# Patient Record
Sex: FEMALE | Race: WHITE | Employment: OTHER | URBAN - METROPOLITAN AREA
[De-identification: names, ages, dates, MRNs, and addresses within clinical notes are randomized per-mention and may not be internally consistent; named-entity substitution may affect disease eponyms.]

---

## 2017-08-07 ENCOUNTER — OFFICE VISIT (OUTPATIENT)
Dept: ORTHOPEDIC SURGERY | Age: 57
End: 2017-08-07

## 2017-08-07 VITALS — BODY MASS INDEX: 51.91 KG/M2 | WEIGHT: 293 LBS | HEIGHT: 63 IN

## 2017-08-07 DIAGNOSIS — M25.562 CHRONIC PAIN OF LEFT KNEE: Primary | ICD-10-CM

## 2017-08-07 DIAGNOSIS — E66.01 MORBID OBESITY WITH BMI OF 60.0-69.9, ADULT (HCC): ICD-10-CM

## 2017-08-07 DIAGNOSIS — G89.29 CHRONIC PAIN OF LEFT KNEE: Primary | ICD-10-CM

## 2017-08-07 DIAGNOSIS — Z96.651 STATUS POST TOTAL RIGHT KNEE REPLACEMENT USING CEMENT: ICD-10-CM

## 2017-08-07 DIAGNOSIS — M17.12 PRIMARY OSTEOARTHRITIS OF LEFT KNEE: ICD-10-CM

## 2017-08-07 PROCEDURE — 20610 DRAIN/INJ JOINT/BURSA W/O US: CPT | Performed by: ORTHOPAEDIC SURGERY

## 2017-08-07 RX ORDER — LANOLIN ALCOHOL/MO/W.PET/CERES
1000 CREAM (GRAM) TOPICAL 3 TIMES DAILY
COMMUNITY

## 2018-06-04 ENCOUNTER — TELEPHONE (OUTPATIENT)
Dept: BARIATRICS/WEIGHT MGMT | Age: 58
End: 2018-06-04

## 2019-06-11 ENCOUNTER — TELEPHONE (OUTPATIENT)
Dept: BARIATRICS/WEIGHT MGMT | Age: 59
End: 2019-06-11

## 2019-12-02 ENCOUNTER — OFFICE VISIT (OUTPATIENT)
Dept: ORTHOPEDIC SURGERY | Age: 59
End: 2019-12-02
Payer: COMMERCIAL

## 2019-12-02 VITALS — WEIGHT: 227 LBS | HEIGHT: 63 IN | BODY MASS INDEX: 40.22 KG/M2

## 2019-12-02 DIAGNOSIS — M17.12 PRIMARY OSTEOARTHRITIS OF LEFT KNEE: ICD-10-CM

## 2019-12-02 DIAGNOSIS — M25.562 LEFT KNEE PAIN, UNSPECIFIED CHRONICITY: Primary | ICD-10-CM

## 2019-12-02 PROCEDURE — 20611 DRAIN/INJ JOINT/BURSA W/US: CPT | Performed by: ORTHOPAEDIC SURGERY

## 2019-12-02 PROCEDURE — 99213 OFFICE O/P EST LOW 20 MIN: CPT | Performed by: ORTHOPAEDIC SURGERY

## 2019-12-02 RX ORDER — BETAMETHASONE SODIUM PHOSPHATE AND BETAMETHASONE ACETATE 3; 3 MG/ML; MG/ML
12 INJECTION, SUSPENSION INTRA-ARTICULAR; INTRALESIONAL; INTRAMUSCULAR; SOFT TISSUE ONCE
Status: COMPLETED | OUTPATIENT
Start: 2019-12-02 | End: 2019-12-02

## 2019-12-02 RX ADMIN — BETAMETHASONE SODIUM PHOSPHATE AND BETAMETHASONE ACETATE 12 MG: 3; 3 INJECTION, SUSPENSION INTRA-ARTICULAR; INTRALESIONAL; INTRAMUSCULAR; SOFT TISSUE at 11:37

## 2022-09-08 RX ORDER — LEVOTHYROXINE SODIUM 88 UG/1
100 TABLET ORAL DAILY
COMMUNITY

## 2022-09-08 RX ORDER — OXYCODONE HCL 10 MG/1
10 TABLET, FILM COATED, EXTENDED RELEASE ORAL ONCE
Status: CANCELLED | OUTPATIENT
Start: 2022-09-08 | End: 2022-09-08

## 2022-09-08 RX ORDER — GABAPENTIN 300 MG/1
300 CAPSULE ORAL NIGHTLY
COMMUNITY

## 2022-09-08 RX ORDER — ACETAMINOPHEN 500 MG
1000 TABLET ORAL ONCE
Status: CANCELLED | OUTPATIENT
Start: 2022-09-08 | End: 2022-09-08

## 2022-09-08 RX ORDER — FERROUS SULFATE 325(65) MG
325 TABLET ORAL
COMMUNITY

## 2022-09-08 RX ORDER — IBUPROFEN 200 MG
1 CAPSULE ORAL DAILY
COMMUNITY

## 2022-09-08 RX ORDER — ACETAMINOPHEN 500 MG
500 TABLET ORAL
Status: ON HOLD | COMMUNITY
End: 2022-10-13 | Stop reason: HOSPADM

## 2022-09-08 RX ORDER — AMOXICILLIN 250 MG
1 CAPSULE ORAL DAILY
COMMUNITY

## 2022-09-08 NOTE — PROGRESS NOTES
The patient will attend class on______9/19_________  The patent will get ordered PATs on_____9/19______________________________  The patient will see PCP within 30 days of scheduled surgery___9/26    Heart 9/27_______

## 2022-09-08 NOTE — PROGRESS NOTES
Name_______________________________________Printed:____________________  Date and time of surgery__10/13   0730______________________Arrival Time:_0600_______________   1. The instructions given regarding when and if a patient needs to stop oral intake prior to surgery varies. Follow the specific instructions you were given                  ___Nothing to eat or to drink after Midnight the night before.                   ____Carbo loading or ERAS instructions will be given to select patients-if you have been given those instructions -please do the following                           The evening before your surgery after dinner before midnight drink 40 ounces of gatorade. If you are diabetic use sugar free. The morning of surgery drink 40 ounces of water. This needs to be finished 3 hours prior to your surgery start time. 2. Take the following pills with a small sip of water on the morning of surgery_inhaler wellbutrin thyroid__________________________________________________                  Do not take blood pressure medications ending in pril or sartan the moira prior to surgery or the morning of surgery_   3. Aspirin, Ibuprofen, Advil, Naproxen, Vitamin E and other Anti-inflammatory products and supplements should be stopped for 5 -7days before surgery or as directed by your physician. 4. Check with your Doctor regarding stopping Plavix, Coumadin,Eliquis, Lovenox,Effient,Pradaxa,Xarelto, Fragmin or other blood thinners and follow their instructions. XARELTO instructions from doctor   5. Do not smoke, and do not drink any alcoholic beverages 24 hours prior to surgery. This includes NA Beer. Refrain from the usage of any recreational drugs. 6. You may brush your teeth and gargle the morning of surgery. DO NOT SWALLOW WATER   7. You MUST make arrangements for a responsible adult to stay on site while you are here and take you home after your surgery. You will not be allowed to leave alone or drive yourself home. It is strongly suggested someone stay with you the first 24 hrs. Your surgery will be cancelled if you do not have a ride home. 8. A parent/legal guardian must accompany a child scheduled for surgery and plan to stay at the hospital until the child is discharged. Please do not bring other children with you. 9. Please wear simple, loose fitting clothing to the hospital.  Nathalie Gonzalez not bring valuables (money, credit cards, checkbooks, etc.) Do not wear any makeup (including no eye makeup) or nail polish on your fingers or toes. 10. DO NOT wear any jewelry or piercings on day of surgery. All body piercing jewelry must be removed. 11. If you have ___dentures, they will be removed before going to the OR; we will provide you a container. If you wear ___contact lenses or ___glasses, they will be removed; please bring a case for them. 12. Please see your family doctor/pediatrician for a history & physical and/or concerning medications. Bring any test results/reports from your physician's office. PCP__________________Phone___________H&P Appt. Date________             13 If you  have a Living Will and Durable Power of  for Healthcare, please bring in a copy. 15. Notify your Surgeon if you develop any illness between now and surgery  time, cough, cold, fever, sore throat, nausea, vomiting, etc.  Please notify your surgeon if you experience dizziness, shortness of breath or blurred vision between now & the time of your surgery             15. DO NOT shave your operative site 96 hours prior to surgery. For face & neck surgery, men may use an electric razor 48 hours prior to surgery. 16. Shower the night before or morning of surgery using an antibacterial soap or as you have been instructed. 17. To provide excellent care visitors will be limited to one in the room at any given time. 18.  Please bring picture ID and insurance card. 19.  Visit our web site for additional information:  [email or web address]              20.During flu season no children under the age of 15 are permitted in the hospital for the safety of all patients. 21. If you take a long acting insulin in the evening only  take half of your usual  dose the night  before your procedure              22. If you use a c-pap please bring DOS if staying overnight,             23.For your convenience [id number] [place] [address] has a pharmacy on site to fill your prescriptions. 24. If you use oxygen and have a portable tank please bring it  with you the DOS             25. Bring a complete list of all your medications with name and dose include any supplements. 26. Other__________________________________________   *Please call pre admission testing if you any further questions   [place] [phone]. [place]  [phone]   [address] [place] [address]       VISITOR POLICY(subject to change)    Current policy is 2 visitors per patient. No children. A mask is required. Visiting hours are 8a-8p. Overnight visitors will be at the discretion of the nurse. All above information reviewed with patient in person or by phone. Patient verbalizes understanding. All questions and concerns addressed.                                                                                                  Patient/Rep____per phone/pt________________                                                                                                                                    PRE OP INSTRUCTIONS

## 2022-09-19 ENCOUNTER — HOSPITAL ENCOUNTER (OUTPATIENT)
Age: 62
Discharge: HOME | End: 2022-09-19
Payer: COMMERCIAL

## 2022-09-19 DIAGNOSIS — Z01.818 PREOP TESTING: ICD-10-CM

## 2022-09-19 LAB
A/G RATIO: 1.5 (ref 1.1–2.2)
ABO/RH: NORMAL
ALBUMIN SERPL-MCNC: 4.1 G/DL (ref 3.4–5)
ALP BLD-CCNC: 94 U/L (ref 40–129)
ALT SERPL-CCNC: 13 U/L (ref 10–40)
ANION GAP SERPL CALCULATED.3IONS-SCNC: 12 MMOL/L (ref 3–16)
ANTIBODY SCREEN: NORMAL
APTT: 45.6 SEC (ref 23–34.3)
AST SERPL-CCNC: 32 U/L (ref 15–37)
BACTERIA: ABNORMAL /HPF
BASOPHILS ABSOLUTE: 0 K/UL (ref 0–0.2)
BASOPHILS RELATIVE PERCENT: 1.1 %
BILIRUB SERPL-MCNC: <0.2 MG/DL (ref 0–1)
BILIRUBIN URINE: NEGATIVE
BLOOD, URINE: NEGATIVE
BUN BLDV-MCNC: 13 MG/DL (ref 7–20)
CALCIUM SERPL-MCNC: 9.2 MG/DL (ref 8.3–10.6)
CHLORIDE BLD-SCNC: 100 MMOL/L (ref 99–110)
CLARITY: ABNORMAL
CO2: 24 MMOL/L (ref 21–32)
COLOR: YELLOW
CREAT SERPL-MCNC: 0.6 MG/DL (ref 0.6–1.2)
CRYSTALS, UA: ABNORMAL /HPF
EOSINOPHILS ABSOLUTE: 0.1 K/UL (ref 0–0.6)
EOSINOPHILS RELATIVE PERCENT: 1.8 %
EPITHELIAL CELLS, UA: 5 /HPF (ref 0–5)
GFR AFRICAN AMERICAN: >60
GFR NON-AFRICAN AMERICAN: >60
GLUCOSE BLD-MCNC: 83 MG/DL (ref 70–99)
GLUCOSE URINE: NEGATIVE MG/DL
HCT VFR BLD CALC: 38.7 % (ref 36–48)
HEMOGLOBIN: 13 G/DL (ref 12–16)
HYALINE CASTS: 0 /LPF (ref 0–8)
INR BLD: 1.47 (ref 0.87–1.14)
KETONES, URINE: NEGATIVE MG/DL
LEUKOCYTE ESTERASE, URINE: ABNORMAL
LYMPHOCYTES ABSOLUTE: 1 K/UL (ref 1–5.1)
LYMPHOCYTES RELATIVE PERCENT: 27.8 %
MCH RBC QN AUTO: 32 PG (ref 26–34)
MCHC RBC AUTO-ENTMCNC: 33.6 G/DL (ref 31–36)
MCV RBC AUTO: 95.2 FL (ref 80–100)
MICROSCOPIC EXAMINATION: YES
MONOCYTES ABSOLUTE: 0.3 K/UL (ref 0–1.3)
MONOCYTES RELATIVE PERCENT: 9.1 %
NEUTROPHILS ABSOLUTE: 2.1 K/UL (ref 1.7–7.7)
NEUTROPHILS RELATIVE PERCENT: 60.2 %
NITRITE, URINE: NEGATIVE
PDW BLD-RTO: 14 % (ref 12.4–15.4)
PH UA: 7.5 (ref 5–8)
PLATELET # BLD: 183 K/UL (ref 135–450)
PMV BLD AUTO: 7.8 FL (ref 5–10.5)
POTASSIUM SERPL-SCNC: 4 MMOL/L (ref 3.5–5.1)
PROTEIN UA: NEGATIVE MG/DL
PROTHROMBIN TIME: 17.7 SEC (ref 11.7–14.5)
RBC # BLD: 4.07 M/UL (ref 4–5.2)
RBC UA: ABNORMAL /HPF (ref 0–4)
SODIUM BLD-SCNC: 136 MMOL/L (ref 136–145)
SPECIFIC GRAVITY UA: 1.01 (ref 1–1.03)
TOTAL PROTEIN: 6.8 G/DL (ref 6.4–8.2)
URINE TYPE: ABNORMAL
UROBILINOGEN, URINE: 1 E.U./DL
WBC # BLD: 3.5 K/UL (ref 4–11)
WBC UA: 14 /HPF (ref 0–5)

## 2022-09-19 PROCEDURE — 83036 HEMOGLOBIN GLYCOSYLATED A1C: CPT

## 2022-09-19 PROCEDURE — 87081 CULTURE SCREEN ONLY: CPT

## 2022-09-19 PROCEDURE — 85610 PROTHROMBIN TIME: CPT

## 2022-09-19 PROCEDURE — 36415 COLL VENOUS BLD VENIPUNCTURE: CPT

## 2022-09-19 PROCEDURE — 86901 BLOOD TYPING SEROLOGIC RH(D): CPT

## 2022-09-19 PROCEDURE — 81001 URINALYSIS AUTO W/SCOPE: CPT

## 2022-09-19 PROCEDURE — 80053 COMPREHEN METABOLIC PANEL: CPT

## 2022-09-19 PROCEDURE — 86900 BLOOD TYPING SEROLOGIC ABO: CPT

## 2022-09-19 PROCEDURE — 85025 COMPLETE CBC W/AUTO DIFF WBC: CPT

## 2022-09-19 PROCEDURE — 87077 CULTURE AEROBIC IDENTIFY: CPT

## 2022-09-19 PROCEDURE — 87186 SC STD MICRODIL/AGAR DIL: CPT

## 2022-09-19 PROCEDURE — 87086 URINE CULTURE/COLONY COUNT: CPT

## 2022-09-19 PROCEDURE — 85730 THROMBOPLASTIN TIME PARTIAL: CPT

## 2022-09-19 PROCEDURE — 86850 RBC ANTIBODY SCREEN: CPT

## 2022-09-20 LAB
ESTIMATED AVERAGE GLUCOSE: 91.1 MG/DL
HBA1C MFR BLD: 4.8 %

## 2022-09-20 NOTE — CARE COORDINATION
ORTHOPAEDIC NURSE NAVIGATOR SUMMARY NOTE      Surgery Details  Anticipated Date of Surgery: 10/13/2022  Surgery: right knee  Surgeon: Brendan Hemphill Education: yes Inperson   If pt did not complete either, why not? N/A    PCP  PCP: SOPHIA You MD   Phone #: [phone]    Date of Labs   Date of PCP Visit for H&P:   Any Noted Concerns from PCP prior to surgery:  unknown       Review of Past Medical History Reveals History of:    Patient Medical Hx:  Past Medical History:   Diagnosis Date    Anesthesia     slow to wake    Asthma     At risk for falling     Wears brace and takes mood alternating,  and hypertensive meds    Bipolar Disorder     Cancer ([address]     skin    Chronic low back pain     Degenerative joint disease     Fibromyalgia     Hypertension     Kidney stone 2013    lithotripsy done    Numbness of feet     states it is sciatica    Obesity     CRIS on CPAP     Osteoarthritis     Reflux     RLS (restless legs syndrome)     Wears glasses      Patients Surgical Hx:   Past Surgical History:   Procedure Laterality Date    APPENDECTOMY      BLADDER SUSPENSION      BREAST BIOPSY      Left    BREAST SURGERY      lumpectomy-benign     SECTION      3-live births    [facility] (CERVIX STATUS UNKNOWN)  2003    JOINT REPLACEMENT Right     knee    KNEE ARTHROPLASTY  6/15/2011    right    KNEE ARTHROSCOPY      right    LAP BAND  7    OTHER SURGICAL HISTORY  13    LAPAROSCOPIC ADJUSTABLE GASTRIC BAND REMOVAL    SKIN CANCER EXCISION        Patients Social Hx:  Social History       Tobacco History       Smoking Status  Former Quit Date  2008 Smoking Frequency  2.00 packs/day for 35.00 years (70.00 pk-yrs) Smoking Tobacco Type  Cigarettes quit in 2008      Smokeless Tobacco Use  Never              Alcohol History       Alcohol Use Status  Not Currently Comment  occ.               Drug Use       Drug Use Status  No Sexual Activity       Sexually Active  Not Asked                  Alcohol use:  Alcohol Use: Not on file     Home Medications:  Prior to Admission medications    Medication Sig Start Date End Date Taking? Authorizing Provider   calcium carbonate (OYSTER SHELL CALCIUM 500 MG) 1250 (500 Ca) MG tablet Take 1 tablet by mouth daily   Yes Historical Provider, MD   senna-docusate (PERICOLACE) 8.6-50 MG per tablet Take 1 tablet by mouth daily   Yes Historical Provider, MD   gabapentin (NEURONTIN) 300 MG capsule Take 300 mg by mouth nightly. Yes Historical Provider, MD   ferrous sulfate (IRON 325) 325 (65 Fe) MG tablet Take 325 mg by mouth daily (with breakfast) Daily every other day takes an extra one   Yes Historical Provider, MD   levothyroxine (SYNTHROID) 88 MCG tablet Take 88 mcg by mouth Daily   Yes Historical Provider, MD   rivaroxaban (XARELTO) 20 MG TABS tablet Take 20 mg by mouth   Yes Historical Provider, MD   acetaminophen (TYLENOL) 500 MG tablet Take 500 mg by mouth 650mg 2 tabs daily   Yes Historical Provider, MD   Liraglutide -Weight Management (SAXENDA SC) Inject into the skin daily   Yes Historical Provider, MD   Multiple Vitamins-Minerals (CENTRUM SILVER ADULT 50+ PO) Take by mouth 2 times daily    Historical Provider, MD   vitamin B-12 (CYANOCOBALAMIN) 1000 MCG tablet Take 1,000 mcg by mouth three times daily    Historical Provider, MD   montelukast (SINGULAIR) 10 MG tablet Take 10 mg by mouth 8/28/14   Historical Provider, MD   ADVAIR DISKUS 500-50 MCG/DOSE diskus inhaler  11/12/15   Historical Provider, MD   topiramate (TOPAMAX) 25 MG tablet 50 mg daily  11/13/15   Historical Provider, MD   fexofenadine (ALLEGRA) 180 MG tablet Take 180 mg by mouth daily. Historical Provider, MD   estradiol (ESTRACE) 1 MG tablet Take 1 mg by mouth daily.       Historical Provider, MD   triamterene-hydrochlorothiazide (MAXZIDE-25) 37.5-25 MG per tablet Take 1 tablet by mouth daily Takes half a tablet Historical Provider, MD   buPROPion (WELLBUTRIN XL) 150 MG XL tablet Take 150 mg by mouth every morning. Historical Provider, MD   topiramate (TOPAMAX) 100 MG tablet Take 100 mg by mouth daily  6/10/10   Historical Provider, MD   ALBUTEROL IN Inhale  into the lungs as needed. Historical Provider, MD   oxcarbazepine (TRILEPTAL) 300 MG tablet Take 900 mg by mouth nightly. 6/10/10   Historical Provider, MD   Cholecalciferol (VITAMIN D) 1000 UNIT TABS Take 2,000 Int'l Units by mouth daily. 4/12/10   Semaj Taylor MD        Medication Contract and Consent for Opioid Use Documents Filed        No documents found                   Pain Management Program:  no    Lab Values:   Hgb/Hct:   Hemoglobin (g/dL)   Date Value   2022 13.0   /  Hematocrit (%)   Date Value   2022 38.7      HgbA1C:    Lab Results   Component Value Date    LABA1C 4.8 2022    LABA1C 5.9 (H) 09/15/2014    LABA1C 5.2 2010      Albumin:    Lab Results   Component Value Date    LABALBU 4.1 2022      BUN/Cr:   BUN (mg/dL)   Date Value   2022 13   /  Creatinine (mg/dL)   Date Value   2022 0.6       Coronary Artery Disease/HTN/CHF History: Yes     Cardiologist: Dr. Soto Begun   Cardiac Clearance Necessary: Yes   Date of Cardiac Clearance Appt:    On Anticoagulation? Yes on XARELOT If YES, when will they stop taking?  TBD   Final Cardiac Recommendations:unknown as of     Diabetes History: No   Most Recent HgbA1C:   Lab Results   Component Value Date    LABA1C 4.8 2022    LABA1C 5.9 (H) 09/15/2014    LABA1C 5.2 2010         Pulmonary: COPD/emphysema/Asthma  Use of home oxygen: no    Tobacco Use      Smoking status: Former        Packs/day: 2.00        Years: 35.00        Pack years: 79        Types: Cigarettes        Quit date: 2008        Years since quittin.8      Smokeless tobacco: Never    Referred for Smoking Cessation: Yes  STOP-BANG SCREEN RISK FACTORS:Hypertension  Age > 50  BMI > 35      DVT Risk Stratification:  Medium   History of Blood Clots:no   On xarelto pre-surgery        BMI Greater than 40 at time of scheduling?: No, bmi 39.68   BMI:    BMI Readings from Last 1 Encounters:   12/02/19 40.21 kg/m²     Has Surgeon been notified of BMI concern? No     Pre-HAB  Prehab Ordered: no  Attended Pre-Hab Program: NA    Additional Medical Concerns:        Discharge Disposition Information:   Patient insurance on file: bc    Anticipated Discharge Disposition:  Home with OP PT    Who will be with patient following discharge? [facility] pt already has: quad cane, straight cane, shower chair, walker, and long handled show horn, reacher, raised toliet seat     Equipment Needs: RW-ok to order   Lives in 2 story home-current under renovations  Number of entry steps: 1  Levels in Home: 2   Bedroom on 2nd floor   Bathroom on 2nd floor, and 1st floor (First floor BR is under renovation)   Pre-op ambulatory dme: Independent    Pt agrees to [address] [place] [address] Street: No   [facility] preference: N/A      Social Determinants of Health     Tobacco Use: Medium Risk    Smoking Tobacco Use: Former    Smokeless Tobacco Use: Never   Alcohol Use: Not on file   Financial Resource Strain: Not on file   Food Insecurity: Not on file   Transportation Needs: Not on file   Physical Activity: Not on file   Stress: Not on file   Social Connections: Not on file   Intimate Partner Violence: Not on file   Depression: Not on file   Housing Stability: Not on file          Mike Gooden RN   9/20/2022  Orthopedic Nurse [address] [place] [address]  [phone]  [email or web address]

## 2022-09-20 NOTE — PROGRESS NOTES
Patient attended Joint Education class on 9/192022. Patient verified surgery for Total knee replacement and received patient information and educational Joint folder including education handouts with joint class power point, pre-operative checklist, helpful tips, and a booklet on pain management after surgery and narcotic safety. Patient given handout instructions on Pre-operative Showering techniques and instructed to use CHG soap 5 days before surgery. Hibiclens bottle given to patient to take home. Patient aware they need to have labs and H&P done before surgery. We also discussed in depth about pain medication and we cannot refill prescriptions early due to state regulations. Answered all of patient's questions in class.       Post-test score 5/5    DOS: 10/13/2022  Dr Мария Aguila, RN  Orthopedic Navigator  [phone]

## 2022-09-21 LAB
MRSA CULTURE ONLY: NORMAL
ORGANISM: ABNORMAL
URINE CULTURE, ROUTINE: ABNORMAL

## 2022-10-11 NOTE — DISCHARGE INSTRUCTIONS
[facility] DISCHARGE ORDER SET [facility]    1. (x   )  Activity instructions for ECF/HOME  Elevate extremity if swelling occurs  ICE to operative site   20 minutes/hour while awake  apply towel over dressing when icing  Continue the exercise program as prescribed by PT/OT   Use walker, crutches or cane with weightbearing instructions as indicated by MD  Do not ambulate without assistance until cleared by PT  Out of bed every hour while awake, ambulating minimum 10 minutes  Ankle pumps bilateral ankles 15x every hour while awake  IS Spirometer every 2 hrs while awake  Drink minimum  Eight  8 oz glasses of water daily       2. ( x  ) Initiate bowel care with the following medications  Colace 100 mg 1 tab by mouth twice daily, continue while on narcotics, hold for loose stools. Call office if you have any difficulties with bowel movements/urinating after surgery        3. ( x  ) Admit s/p     ( X ) left    (  X)TKA        4.  (x) START OUTPATIENT PHYSICAL THERAPY ON MONDAY  CALL OFFICE T O               SCHEDULE [phone]        (  x  ) PT    (   ) OT    Rom, strengthening ex, ADL's       (   ) 6 home PT visits including initial evaluation       (X) outpatient PT 2-3 x a week for 4 weeks       (   ) PT daily    5. (x   )  Weight bearing/limitations    (X  ) left    ( X ) lower  extremity     ( X ) FWB    ( X ) CPM  Start 0-60 degrees,day after surgery, increase 10 degrees daily or as tolerated, use minimum     6 hrs/day   Dc CPM when AROM > 120 degrees    6.  (   )  Labs  Pt/INR  on Monday and Thursdays for 4 weeks, call results to   [phone]  before 3 pm for coumadin dose    7.( x  ) Dressing/wound care  Ok to shower on Sunday  Knee replacement: Remove ace wrap/gauze on post op day 3  Leave Clear Therabond dressing on until first post op appt with MD  If you had knee replacement cover  knee with saran wrap when showering, remove saran wrap after showering   Avoid direct water contact to post op dressing when showering  NO BATHS    Pat dressing dry with soft towel after showering. (X)  Home nursing care not medically necessary    8. (  x ) DVT PROPHYLAXIS  ( x  ) Thigh/knee hi sera hose bilateral lower extremities, OFF AT NIGHT  (  x ) Resume xeralto evening of surgery           9.  Continue these meds if you were started on them prior to surgery      Neurontin 300 mg daily      Cymbalta 30 mg daily intitially then as instructed in information packet given to      you  from the office         Call office for refills on above medications if you run out           Call office with any questions  [phone]  Follow up appt  with ortho in 2 weeks  ELECTRONICALLY SIGNED BY: Indy Rivas MD, 10/13/2022

## 2022-10-12 ENCOUNTER — ANESTHESIA EVENT (OUTPATIENT)
Dept: OPERATING ROOM | Age: 62
End: 2022-10-12
Payer: COMMERCIAL

## 2022-10-13 ENCOUNTER — ANESTHESIA (OUTPATIENT)
Dept: OPERATING ROOM | Age: 62
End: 2022-10-13
Payer: COMMERCIAL

## 2022-10-13 ENCOUNTER — APPOINTMENT (OUTPATIENT)
Dept: GENERAL RADIOLOGY | Age: 62
End: 2022-10-13
Attending: ORTHOPAEDIC SURGERY
Payer: COMMERCIAL

## 2022-10-13 ENCOUNTER — HOSPITAL ENCOUNTER (OUTPATIENT)
Age: 62
Discharge: HOME | End: 2022-10-14
Attending: ORTHOPAEDIC SURGERY | Admitting: ORTHOPAEDIC SURGERY
Payer: COMMERCIAL

## 2022-10-13 DIAGNOSIS — M17.12 PRIMARY OSTEOARTHRITIS OF LEFT KNEE: ICD-10-CM

## 2022-10-13 DIAGNOSIS — Z01.818 PREOP TESTING: Primary | ICD-10-CM

## 2022-10-13 PROBLEM — E66.01 MORBID OBESITY (HCC): Status: ACTIVE | Noted: 2022-10-13

## 2022-10-13 PROBLEM — E03.9 HYPOTHYROID: Status: ACTIVE | Noted: 2022-10-13

## 2022-10-13 PROBLEM — E87.1 HYPONATREMIA: Status: ACTIVE | Noted: 2022-10-13

## 2022-10-13 LAB
ABO/RH: NORMAL
ALBUMIN SERPL-MCNC: 3.9 G/DL (ref 3.4–5)
ANION GAP SERPL CALCULATED.3IONS-SCNC: 9 MMOL/L (ref 3–16)
ANTIBODY SCREEN: NORMAL
BASOPHILS ABSOLUTE: 0 K/UL (ref 0–0.2)
BASOPHILS RELATIVE PERCENT: 0.1 %
BUN BLDV-MCNC: 9 MG/DL (ref 7–20)
CALCIUM SERPL-MCNC: 8.4 MG/DL (ref 8.3–10.6)
CHLORIDE BLD-SCNC: 95 MMOL/L (ref 99–110)
CO2: 23 MMOL/L (ref 21–32)
CREAT SERPL-MCNC: 0.5 MG/DL (ref 0.6–1.2)
EOSINOPHILS ABSOLUTE: 0 K/UL (ref 0–0.6)
EOSINOPHILS RELATIVE PERCENT: 0 %
GFR AFRICAN AMERICAN: >60
GFR NON-AFRICAN AMERICAN: >60
GLUCOSE BLD-MCNC: 128 MG/DL (ref 70–99)
GLUCOSE BLD-MCNC: 148 MG/DL (ref 70–99)
GLUCOSE BLD-MCNC: 80 MG/DL (ref 70–99)
GLUCOSE BLD-MCNC: 97 MG/DL (ref 70–99)
HCT VFR BLD CALC: 35.9 % (ref 36–48)
HEMOGLOBIN: 12 G/DL (ref 12–16)
LYMPHOCYTES ABSOLUTE: 0.3 K/UL (ref 1–5.1)
LYMPHOCYTES RELATIVE PERCENT: 3.2 %
MCH RBC QN AUTO: 31.4 PG (ref 26–34)
MCHC RBC AUTO-ENTMCNC: 33.5 G/DL (ref 31–36)
MCV RBC AUTO: 93.8 FL (ref 80–100)
MONOCYTES ABSOLUTE: 0.1 K/UL (ref 0–1.3)
MONOCYTES RELATIVE PERCENT: 1.6 %
NEUTROPHILS ABSOLUTE: 7.5 K/UL (ref 1.7–7.7)
NEUTROPHILS RELATIVE PERCENT: 95.1 %
PDW BLD-RTO: 13.4 % (ref 12.4–15.4)
PERFORMED ON: ABNORMAL
PERFORMED ON: NORMAL
PERFORMED ON: NORMAL
PHOSPHORUS: 3.8 MG/DL (ref 2.5–4.9)
PLATELET # BLD: 169 K/UL (ref 135–450)
PMV BLD AUTO: 7.8 FL (ref 5–10.5)
POTASSIUM SERPL-SCNC: 4.1 MMOL/L (ref 3.5–5.1)
RBC # BLD: 3.82 M/UL (ref 4–5.2)
REASON FOR REJECTION: NORMAL
REJECTED TEST: NORMAL
SODIUM BLD-SCNC: 127 MMOL/L (ref 136–145)
WBC # BLD: 7.9 K/UL (ref 4–11)

## 2022-10-13 PROCEDURE — 6360000002 HC RX W HCPCS: Performed by: ORTHOPAEDIC SURGERY

## 2022-10-13 PROCEDURE — 36415 COLL VENOUS BLD VENIPUNCTURE: CPT

## 2022-10-13 PROCEDURE — 2709999900 HC NON-CHARGEABLE SUPPLY: Performed by: ORTHOPAEDIC SURGERY

## 2022-10-13 PROCEDURE — 2500000003 HC RX 250 WO HCPCS: Performed by: ORTHOPAEDIC SURGERY

## 2022-10-13 PROCEDURE — 6370000000 HC RX 637 (ALT 250 FOR IP): Performed by: ORTHOPAEDIC SURGERY

## 2022-10-13 PROCEDURE — 93005 ELECTROCARDIOGRAM TRACING: CPT | Performed by: STUDENT IN AN ORGANIZED HEALTH CARE EDUCATION/TRAINING PROGRAM

## 2022-10-13 PROCEDURE — 94640 AIRWAY INHALATION TREATMENT: CPT

## 2022-10-13 PROCEDURE — 85025 COMPLETE CBC W/AUTO DIFF WBC: CPT

## 2022-10-13 PROCEDURE — 7100000000 HC PACU RECOVERY - FIRST 15 MIN: Performed by: ORTHOPAEDIC SURGERY

## 2022-10-13 PROCEDURE — 3600000004 HC SURGERY LEVEL 4 BASE: Performed by: ORTHOPAEDIC SURGERY

## 2022-10-13 PROCEDURE — 6360000002 HC RX W HCPCS: Performed by: NURSE ANESTHETIST, CERTIFIED REGISTERED

## 2022-10-13 PROCEDURE — 86850 RBC ANTIBODY SCREEN: CPT

## 2022-10-13 PROCEDURE — 3700000001 HC ADD 15 MINUTES (ANESTHESIA): Performed by: ORTHOPAEDIC SURGERY

## 2022-10-13 PROCEDURE — 3600000014 HC SURGERY LEVEL 4 ADDTL 15MIN: Performed by: ORTHOPAEDIC SURGERY

## 2022-10-13 PROCEDURE — 7100000001 HC PACU RECOVERY - ADDTL 15 MIN: Performed by: ORTHOPAEDIC SURGERY

## 2022-10-13 PROCEDURE — 3700000000 HC ANESTHESIA ATTENDED CARE: Performed by: ORTHOPAEDIC SURGERY

## 2022-10-13 PROCEDURE — 86900 BLOOD TYPING SEROLOGIC ABO: CPT

## 2022-10-13 PROCEDURE — 2700000000 HC OXYGEN THERAPY PER DAY

## 2022-10-13 PROCEDURE — 6360000002 HC RX W HCPCS: Performed by: STUDENT IN AN ORGANIZED HEALTH CARE EDUCATION/TRAINING PROGRAM

## 2022-10-13 PROCEDURE — C1776 JOINT DEVICE (IMPLANTABLE): HCPCS | Performed by: ORTHOPAEDIC SURGERY

## 2022-10-13 PROCEDURE — A4217 STERILE WATER/SALINE, 500 ML: HCPCS | Performed by: ORTHOPAEDIC SURGERY

## 2022-10-13 PROCEDURE — 2580000003 HC RX 258: Performed by: ORTHOPAEDIC SURGERY

## 2022-10-13 PROCEDURE — 94761 N-INVAS EAR/PLS OXIMETRY MLT: CPT

## 2022-10-13 PROCEDURE — 97530 THERAPEUTIC ACTIVITIES: CPT

## 2022-10-13 PROCEDURE — C1713 ANCHOR/SCREW BN/BN,TIS/BN: HCPCS | Performed by: ORTHOPAEDIC SURGERY

## 2022-10-13 PROCEDURE — 97166 OT EVAL MOD COMPLEX 45 MIN: CPT

## 2022-10-13 PROCEDURE — 2580000003 HC RX 258: Performed by: STUDENT IN AN ORGANIZED HEALTH CARE EDUCATION/TRAINING PROGRAM

## 2022-10-13 PROCEDURE — 64447 NJX AA&/STRD FEMORAL NRV IMG: CPT | Performed by: ANESTHESIOLOGY

## 2022-10-13 PROCEDURE — 2720000010 HC SURG SUPPLY STERILE: Performed by: ORTHOPAEDIC SURGERY

## 2022-10-13 PROCEDURE — 6360000002 HC RX W HCPCS: Performed by: ANESTHESIOLOGY

## 2022-10-13 PROCEDURE — 2500000003 HC RX 250 WO HCPCS: Performed by: NURSE ANESTHETIST, CERTIFIED REGISTERED

## 2022-10-13 PROCEDURE — 2580000003 HC RX 258: Performed by: NURSE ANESTHETIST, CERTIFIED REGISTERED

## 2022-10-13 PROCEDURE — 86901 BLOOD TYPING SEROLOGIC RH(D): CPT

## 2022-10-13 PROCEDURE — 73560 X-RAY EXAM OF KNEE 1 OR 2: CPT

## 2022-10-13 PROCEDURE — 97162 PT EVAL MOD COMPLEX 30 MIN: CPT

## 2022-10-13 PROCEDURE — 80069 RENAL FUNCTION PANEL: CPT

## 2022-10-13 PROCEDURE — 1200000000 HC SEMI PRIVATE

## 2022-10-13 PROCEDURE — 6370000000 HC RX 637 (ALT 250 FOR IP): Performed by: INTERNAL MEDICINE

## 2022-10-13 DEVICE — PSN MC VE ASF L 12MM 8-11 EF: Type: IMPLANTABLE DEVICE | Site: KNEE | Status: FUNCTIONAL

## 2022-10-13 DEVICE — CEMENT BNE 40GM HI VISC RADPQ FOR REV SURG: Type: IMPLANTABLE DEVICE | Site: KNEE | Status: FUNCTIONAL

## 2022-10-13 DEVICE — EXTENSION STEM SZ E 5DEG L TIBL KNEE CEM NAT TIB PERSONA: Type: IMPLANTABLE DEVICE | Site: KNEE | Status: FUNCTIONAL

## 2022-10-13 DEVICE — COMPONENT PAT DIA38MM THK9.5MM STD VIVACIT-E CEM INSET FOR: Type: IMPLANTABLE DEVICE | Site: KNEE | Status: FUNCTIONAL

## 2022-10-13 DEVICE — IMPLANTABLE DEVICE: Type: IMPLANTABLE DEVICE | Site: KNEE | Status: FUNCTIONAL

## 2022-10-13 RX ORDER — DOCUSATE SODIUM 100 MG/1
100 CAPSULE, LIQUID FILLED ORAL 2 TIMES DAILY
Qty: 60 CAPSULE | Refills: 0 | Status: SHIPPED | OUTPATIENT
Start: 2022-10-13

## 2022-10-13 RX ORDER — VANCOMYCIN HYDROCHLORIDE 1 G/20ML
INJECTION, POWDER, LYOPHILIZED, FOR SOLUTION INTRAVENOUS
Status: COMPLETED | OUTPATIENT
Start: 2022-10-13 | End: 2022-10-13

## 2022-10-13 RX ORDER — SODIUM CHLORIDE 0.9 % (FLUSH) 0.9 %
5-40 SYRINGE (ML) INJECTION EVERY 12 HOURS SCHEDULED
Status: DISCONTINUED | OUTPATIENT
Start: 2022-10-13 | End: 2022-10-14 | Stop reason: HOSPADM

## 2022-10-13 RX ORDER — OXYCODONE HYDROCHLORIDE 5 MG/1
10 TABLET ORAL PRN
Status: DISCONTINUED | OUTPATIENT
Start: 2022-10-13 | End: 2022-10-13 | Stop reason: HOSPADM

## 2022-10-13 RX ORDER — CEFADROXIL 500 MG/1
500 CAPSULE ORAL 2 TIMES DAILY
Qty: 14 CAPSULE | Refills: 0 | Status: SHIPPED | OUTPATIENT
Start: 2022-10-13 | End: 2022-10-20

## 2022-10-13 RX ORDER — DEXAMETHASONE SODIUM PHOSPHATE 4 MG/ML
INJECTION, SOLUTION INTRA-ARTICULAR; INTRALESIONAL; INTRAMUSCULAR; INTRAVENOUS; SOFT TISSUE PRN
Status: DISCONTINUED | OUTPATIENT
Start: 2022-10-13 | End: 2022-10-13 | Stop reason: SDUPTHER

## 2022-10-13 RX ORDER — MAGNESIUM HYDROXIDE 1200 MG/15ML
LIQUID ORAL CONTINUOUS PRN
Status: COMPLETED | OUTPATIENT
Start: 2022-10-13 | End: 2022-10-13

## 2022-10-13 RX ORDER — GABAPENTIN 300 MG/1
600 CAPSULE ORAL NIGHTLY
Status: DISCONTINUED | OUTPATIENT
Start: 2022-10-13 | End: 2022-10-14 | Stop reason: HOSPADM

## 2022-10-13 RX ORDER — ACETAMINOPHEN 500 MG
1000 TABLET ORAL EVERY 8 HOURS SCHEDULED
Status: DISCONTINUED | OUTPATIENT
Start: 2022-10-13 | End: 2022-10-14 | Stop reason: HOSPADM

## 2022-10-13 RX ORDER — SODIUM CHLORIDE, SODIUM LACTATE, POTASSIUM CHLORIDE, CALCIUM CHLORIDE 600; 310; 30; 20 MG/100ML; MG/100ML; MG/100ML; MG/100ML
INJECTION, SOLUTION INTRAVENOUS CONTINUOUS
Status: DISCONTINUED | OUTPATIENT
Start: 2022-10-13 | End: 2022-10-13 | Stop reason: HOSPADM

## 2022-10-13 RX ORDER — HYDRALAZINE HYDROCHLORIDE 20 MG/ML
10 INJECTION INTRAMUSCULAR; INTRAVENOUS EVERY 6 HOURS PRN
Status: DISCONTINUED | OUTPATIENT
Start: 2022-10-13 | End: 2022-10-14 | Stop reason: HOSPADM

## 2022-10-13 RX ORDER — FENTANYL CITRATE 50 UG/ML
25 INJECTION, SOLUTION INTRAMUSCULAR; INTRAVENOUS EVERY 5 MIN PRN
Status: COMPLETED | OUTPATIENT
Start: 2022-10-13 | End: 2022-10-13

## 2022-10-13 RX ORDER — OXYCODONE HCL 20 MG/1
20 TABLET, FILM COATED, EXTENDED RELEASE ORAL ONCE
Status: COMPLETED | OUTPATIENT
Start: 2022-10-13 | End: 2022-10-13

## 2022-10-13 RX ORDER — TOPIRAMATE 100 MG/1
100 TABLET, FILM COATED ORAL DAILY
Status: DISCONTINUED | OUTPATIENT
Start: 2022-10-13 | End: 2022-10-13

## 2022-10-13 RX ORDER — ROPIVACAINE HYDROCHLORIDE 5 MG/ML
INJECTION, SOLUTION EPIDURAL; INFILTRATION; PERINEURAL
Status: COMPLETED | OUTPATIENT
Start: 2022-10-13 | End: 2022-10-13

## 2022-10-13 RX ORDER — SODIUM CHLORIDE, SODIUM LACTATE, POTASSIUM CHLORIDE, CALCIUM CHLORIDE 600; 310; 30; 20 MG/100ML; MG/100ML; MG/100ML; MG/100ML
500 INJECTION, SOLUTION INTRAVENOUS ONCE
Status: COMPLETED | OUTPATIENT
Start: 2022-10-13 | End: 2022-10-14

## 2022-10-13 RX ORDER — BISACODYL 10 MG
10 SUPPOSITORY, RECTAL RECTAL DAILY PRN
Status: DISCONTINUED | OUTPATIENT
Start: 2022-10-13 | End: 2022-10-14 | Stop reason: HOSPADM

## 2022-10-13 RX ORDER — POTASSIUM CHLORIDE 20 MEQ/1
40 TABLET, EXTENDED RELEASE ORAL PRN
Status: DISCONTINUED | OUTPATIENT
Start: 2022-10-13 | End: 2022-10-13 | Stop reason: SDUPTHER

## 2022-10-13 RX ORDER — FENTANYL CITRATE 50 UG/ML
INJECTION, SOLUTION INTRAMUSCULAR; INTRAVENOUS PRN
Status: DISCONTINUED | OUTPATIENT
Start: 2022-10-13 | End: 2022-10-13 | Stop reason: SDUPTHER

## 2022-10-13 RX ORDER — FENTANYL CITRATE 50 UG/ML
50 INJECTION, SOLUTION INTRAMUSCULAR; INTRAVENOUS EVERY 5 MIN PRN
Status: DISCONTINUED | OUTPATIENT
Start: 2022-10-13 | End: 2022-10-13 | Stop reason: HOSPADM

## 2022-10-13 RX ORDER — OXCARBAZEPINE 300 MG/1
900 TABLET, FILM COATED ORAL NIGHTLY
Status: DISCONTINUED | OUTPATIENT
Start: 2022-10-13 | End: 2022-10-14 | Stop reason: HOSPADM

## 2022-10-13 RX ORDER — BUPROPION HYDROCHLORIDE 150 MG/1
150 TABLET ORAL EVERY MORNING
Status: DISCONTINUED | OUTPATIENT
Start: 2022-10-14 | End: 2022-10-14 | Stop reason: HOSPADM

## 2022-10-13 RX ORDER — DOCUSATE SODIUM 100 MG/1
100 CAPSULE, LIQUID FILLED ORAL DAILY
Status: DISCONTINUED | OUTPATIENT
Start: 2022-10-13 | End: 2022-10-14 | Stop reason: HOSPADM

## 2022-10-13 RX ORDER — POTASSIUM CHLORIDE 20 MEQ/1
40 TABLET, EXTENDED RELEASE ORAL PRN
Status: DISCONTINUED | OUTPATIENT
Start: 2022-10-13 | End: 2022-10-14 | Stop reason: HOSPADM

## 2022-10-13 RX ORDER — OXYCODONE HYDROCHLORIDE 5 MG/1
5 TABLET ORAL EVERY 6 HOURS PRN
Qty: 28 TABLET | Refills: 0 | Status: SHIPPED | OUTPATIENT
Start: 2022-10-13 | End: 2022-10-20

## 2022-10-13 RX ORDER — ACETAMINOPHEN 500 MG
1000 TABLET ORAL 3 TIMES DAILY
COMMUNITY
Start: 2022-10-13

## 2022-10-13 RX ORDER — SODIUM CHLORIDE 9 MG/ML
INJECTION, SOLUTION INTRAVENOUS PRN
Status: DISCONTINUED | OUTPATIENT
Start: 2022-10-13 | End: 2022-10-14 | Stop reason: HOSPADM

## 2022-10-13 RX ORDER — DEXAMETHASONE SODIUM PHOSPHATE 10 MG/ML
10 INJECTION, SOLUTION INTRAMUSCULAR; INTRAVENOUS ONCE
Status: COMPLETED | OUTPATIENT
Start: 2022-10-13 | End: 2022-10-13

## 2022-10-13 RX ORDER — 0.9 % SODIUM CHLORIDE 0.9 %
500 INTRAVENOUS SOLUTION INTRAVENOUS PRN
Status: DISCONTINUED | OUTPATIENT
Start: 2022-10-13 | End: 2022-10-14 | Stop reason: HOSPADM

## 2022-10-13 RX ORDER — PROPOFOL 10 MG/ML
INJECTION, EMULSION INTRAVENOUS PRN
Status: DISCONTINUED | OUTPATIENT
Start: 2022-10-13 | End: 2022-10-13 | Stop reason: SDUPTHER

## 2022-10-13 RX ORDER — TOPIRAMATE 100 MG/1
50 TABLET, FILM COATED ORAL DAILY
Status: DISCONTINUED | OUTPATIENT
Start: 2022-10-13 | End: 2022-10-13 | Stop reason: SDUPTHER

## 2022-10-13 RX ORDER — LANOLIN ALCOHOL/MO/W.PET/CERES
1000 CREAM (GRAM) TOPICAL 3 TIMES DAILY
Status: DISCONTINUED | OUTPATIENT
Start: 2022-10-13 | End: 2022-10-14 | Stop reason: HOSPADM

## 2022-10-13 RX ORDER — 0.9 % SODIUM CHLORIDE 0.9 %
500 INTRAVENOUS SOLUTION INTRAVENOUS PRN
Status: DISCONTINUED | OUTPATIENT
Start: 2022-10-13 | End: 2022-10-13 | Stop reason: SDUPTHER

## 2022-10-13 RX ORDER — MAGNESIUM SULFATE HEPTAHYDRATE 500 MG/ML
INJECTION, SOLUTION INTRAMUSCULAR; INTRAVENOUS PRN
Status: DISCONTINUED | OUTPATIENT
Start: 2022-10-13 | End: 2022-10-13 | Stop reason: SDUPTHER

## 2022-10-13 RX ORDER — OXYCODONE HYDROCHLORIDE 5 MG/1
5 TABLET ORAL EVERY 4 HOURS PRN
Status: DISCONTINUED | OUTPATIENT
Start: 2022-10-13 | End: 2022-10-14 | Stop reason: HOSPADM

## 2022-10-13 RX ORDER — MEPERIDINE HYDROCHLORIDE 25 MG/ML
12.5 INJECTION INTRAMUSCULAR; INTRAVENOUS; SUBCUTANEOUS
Status: DISCONTINUED | OUTPATIENT
Start: 2022-10-13 | End: 2022-10-13 | Stop reason: HOSPADM

## 2022-10-13 RX ORDER — ONDANSETRON 2 MG/ML
INJECTION INTRAMUSCULAR; INTRAVENOUS PRN
Status: DISCONTINUED | OUTPATIENT
Start: 2022-10-13 | End: 2022-10-13 | Stop reason: SDUPTHER

## 2022-10-13 RX ORDER — ALBUTEROL SULFATE 90 UG/1
2 AEROSOL, METERED RESPIRATORY (INHALATION) EVERY 4 HOURS PRN
Status: DISCONTINUED | OUTPATIENT
Start: 2022-10-13 | End: 2022-10-14 | Stop reason: HOSPADM

## 2022-10-13 RX ORDER — OXYCODONE HYDROCHLORIDE 5 MG/1
5 TABLET ORAL PRN
Status: DISCONTINUED | OUTPATIENT
Start: 2022-10-13 | End: 2022-10-13 | Stop reason: HOSPADM

## 2022-10-13 RX ORDER — DOCUSATE SODIUM 100 MG/1
100 CAPSULE, LIQUID FILLED ORAL DAILY
Status: DISCONTINUED | OUTPATIENT
Start: 2022-10-14 | End: 2022-10-13

## 2022-10-13 RX ORDER — HYDROMORPHONE HYDROCHLORIDE 1 MG/ML
0.25 INJECTION, SOLUTION INTRAMUSCULAR; INTRAVENOUS; SUBCUTANEOUS
Status: DISCONTINUED | OUTPATIENT
Start: 2022-10-13 | End: 2022-10-14 | Stop reason: HOSPADM

## 2022-10-13 RX ORDER — SODIUM CHLORIDE 9 MG/ML
INJECTION, SOLUTION INTRAVENOUS CONTINUOUS
Status: DISCONTINUED | OUTPATIENT
Start: 2022-10-13 | End: 2022-10-14 | Stop reason: HOSPADM

## 2022-10-13 RX ORDER — MAGNESIUM HYDROXIDE 1200 MG/15ML
LIQUID ORAL
Status: COMPLETED | OUTPATIENT
Start: 2022-10-13 | End: 2022-10-13

## 2022-10-13 RX ORDER — TOPIRAMATE 100 MG/1
100 TABLET, FILM COATED ORAL DAILY
Status: DISCONTINUED | OUTPATIENT
Start: 2022-10-13 | End: 2022-10-13 | Stop reason: SDUPTHER

## 2022-10-13 RX ORDER — ESTRADIOL 1 MG/1
1 TABLET ORAL DAILY
Status: DISCONTINUED | OUTPATIENT
Start: 2022-10-13 | End: 2022-10-14 | Stop reason: HOSPADM

## 2022-10-13 RX ORDER — DIPHENHYDRAMINE HYDROCHLORIDE 50 MG/ML
12.5 INJECTION INTRAMUSCULAR; INTRAVENOUS
Status: DISCONTINUED | OUTPATIENT
Start: 2022-10-13 | End: 2022-10-13 | Stop reason: HOSPADM

## 2022-10-13 RX ORDER — POTASSIUM CHLORIDE 7.45 MG/ML
10 INJECTION INTRAVENOUS PRN
Status: DISCONTINUED | OUTPATIENT
Start: 2022-10-13 | End: 2022-10-14 | Stop reason: HOSPADM

## 2022-10-13 RX ORDER — TRIAMTERENE AND HYDROCHLOROTHIAZIDE 37.5; 25 MG/1; MG/1
1 TABLET ORAL DAILY
Status: DISCONTINUED | OUTPATIENT
Start: 2022-10-13 | End: 2022-10-13

## 2022-10-13 RX ORDER — HYDRALAZINE HYDROCHLORIDE 20 MG/ML
10 INJECTION INTRAMUSCULAR; INTRAVENOUS
Status: DISCONTINUED | OUTPATIENT
Start: 2022-10-13 | End: 2022-10-13 | Stop reason: HOSPADM

## 2022-10-13 RX ORDER — MIDAZOLAM HYDROCHLORIDE 2 MG/2ML
2 INJECTION, SOLUTION INTRAMUSCULAR; INTRAVENOUS
Status: COMPLETED | OUTPATIENT
Start: 2022-10-13 | End: 2022-10-13

## 2022-10-13 RX ORDER — POTASSIUM CHLORIDE 7.45 MG/ML
10 INJECTION INTRAVENOUS PRN
Status: DISCONTINUED | OUTPATIENT
Start: 2022-10-13 | End: 2022-10-13 | Stop reason: SDUPTHER

## 2022-10-13 RX ORDER — TOPIRAMATE 100 MG/1
50 TABLET, FILM COATED ORAL DAILY
Status: DISCONTINUED | OUTPATIENT
Start: 2022-10-13 | End: 2022-10-13

## 2022-10-13 RX ORDER — LEVOTHYROXINE SODIUM 0.1 MG/1
100 TABLET ORAL DAILY
Status: DISCONTINUED | OUTPATIENT
Start: 2022-10-14 | End: 2022-10-14 | Stop reason: HOSPADM

## 2022-10-13 RX ORDER — HALOPERIDOL 5 MG/ML
1 INJECTION INTRAMUSCULAR
Status: DISCONTINUED | OUTPATIENT
Start: 2022-10-13 | End: 2022-10-13 | Stop reason: HOSPADM

## 2022-10-13 RX ORDER — HYDRALAZINE HYDROCHLORIDE 20 MG/ML
10 INJECTION INTRAMUSCULAR; INTRAVENOUS EVERY 6 HOURS PRN
Status: DISCONTINUED | OUTPATIENT
Start: 2022-10-13 | End: 2022-10-13 | Stop reason: SDUPTHER

## 2022-10-13 RX ORDER — NALOXONE HYDROCHLORIDE 0.4 MG/ML
0.4 INJECTION, SOLUTION INTRAMUSCULAR; INTRAVENOUS; SUBCUTANEOUS PRN
Status: DISCONTINUED | OUTPATIENT
Start: 2022-10-13 | End: 2022-10-14 | Stop reason: HOSPADM

## 2022-10-13 RX ORDER — LABETALOL HYDROCHLORIDE 5 MG/ML
10 INJECTION, SOLUTION INTRAVENOUS
Status: DISCONTINUED | OUTPATIENT
Start: 2022-10-13 | End: 2022-10-13 | Stop reason: HOSPADM

## 2022-10-13 RX ORDER — TOPIRAMATE 100 MG/1
150 TABLET, FILM COATED ORAL NIGHTLY
Status: DISCONTINUED | OUTPATIENT
Start: 2022-10-13 | End: 2022-10-14 | Stop reason: HOSPADM

## 2022-10-13 RX ORDER — OXYCODONE HYDROCHLORIDE 5 MG/1
10 TABLET ORAL EVERY 4 HOURS PRN
Status: DISCONTINUED | OUTPATIENT
Start: 2022-10-13 | End: 2022-10-14 | Stop reason: HOSPADM

## 2022-10-13 RX ORDER — LIDOCAINE HYDROCHLORIDE 10 MG/ML
0.5 INJECTION, SOLUTION EPIDURAL; INFILTRATION; INTRACAUDAL; PERINEURAL ONCE
Status: DISCONTINUED | OUTPATIENT
Start: 2022-10-13 | End: 2022-10-13 | Stop reason: HOSPADM

## 2022-10-13 RX ORDER — SODIUM CHLORIDE 0.9 % (FLUSH) 0.9 %
5-40 SYRINGE (ML) INJECTION PRN
Status: DISCONTINUED | OUTPATIENT
Start: 2022-10-13 | End: 2022-10-14 | Stop reason: HOSPADM

## 2022-10-13 RX ORDER — APREPITANT 40 MG/1
40 CAPSULE ORAL ONCE
Status: COMPLETED | OUTPATIENT
Start: 2022-10-13 | End: 2022-10-13

## 2022-10-13 RX ORDER — MONTELUKAST SODIUM 10 MG/1
10 TABLET ORAL NIGHTLY
Status: DISCONTINUED | OUTPATIENT
Start: 2022-10-13 | End: 2022-10-14 | Stop reason: HOSPADM

## 2022-10-13 RX ORDER — LIDOCAINE HYDROCHLORIDE 10 MG/ML
INJECTION, SOLUTION EPIDURAL; INFILTRATION; INTRACAUDAL; PERINEURAL PRN
Status: DISCONTINUED | OUTPATIENT
Start: 2022-10-13 | End: 2022-10-13 | Stop reason: SDUPTHER

## 2022-10-13 RX ORDER — OXCARBAZEPINE 300 MG/1
900 TABLET, FILM COATED ORAL NIGHTLY
Status: DISCONTINUED | OUTPATIENT
Start: 2022-10-14 | End: 2022-10-13

## 2022-10-13 RX ORDER — HYDROMORPHONE HYDROCHLORIDE 1 MG/ML
0.5 INJECTION, SOLUTION INTRAMUSCULAR; INTRAVENOUS; SUBCUTANEOUS
Status: DISCONTINUED | OUTPATIENT
Start: 2022-10-13 | End: 2022-10-14 | Stop reason: HOSPADM

## 2022-10-13 RX ORDER — ONDANSETRON 2 MG/ML
4 INJECTION INTRAMUSCULAR; INTRAVENOUS
Status: DISCONTINUED | OUTPATIENT
Start: 2022-10-13 | End: 2022-10-13 | Stop reason: HOSPADM

## 2022-10-13 RX ADMIN — SODIUM CHLORIDE, POTASSIUM CHLORIDE, SODIUM LACTATE AND CALCIUM CHLORIDE 500 ML: 600; 310; 30; 20 INJECTION, SOLUTION INTRAVENOUS at 12:45

## 2022-10-13 RX ADMIN — Medication 10 ML: at 12:53

## 2022-10-13 RX ADMIN — PROPOFOL 150 MG: 10 INJECTION, EMULSION INTRAVENOUS at 07:32

## 2022-10-13 RX ADMIN — CYANOCOBALAMIN TAB 1000 MCG 1000 MCG: 1000 TAB at 22:57

## 2022-10-13 RX ADMIN — FENTANYL CITRATE 25 MCG: 0.05 INJECTION, SOLUTION INTRAMUSCULAR; INTRAVENOUS at 09:40

## 2022-10-13 RX ADMIN — OXYCODONE 5 MG: 5 TABLET ORAL at 15:13

## 2022-10-13 RX ADMIN — OXYCODONE 5 MG: 5 TABLET ORAL at 19:17

## 2022-10-13 RX ADMIN — OXYCODONE 10 MG: 5 TABLET ORAL at 11:15

## 2022-10-13 RX ADMIN — DEXAMETHASONE SODIUM PHOSPHATE 10 MG: 10 INJECTION, SOLUTION INTRAMUSCULAR; INTRAVENOUS at 07:16

## 2022-10-13 RX ADMIN — FENTANYL CITRATE 25 MCG: 50 INJECTION, SOLUTION INTRAMUSCULAR; INTRAVENOUS at 07:55

## 2022-10-13 RX ADMIN — FENTANYL CITRATE 25 MCG: 0.05 INJECTION, SOLUTION INTRAMUSCULAR; INTRAVENOUS at 08:57

## 2022-10-13 RX ADMIN — FENTANYL CITRATE 25 MCG: 0.05 INJECTION, SOLUTION INTRAMUSCULAR; INTRAVENOUS at 09:07

## 2022-10-13 RX ADMIN — NALXONE HYDROCHLORIDE 0.2 MG: 0.4 INJECTION INTRAMUSCULAR; INTRAVENOUS; SUBCUTANEOUS at 12:42

## 2022-10-13 RX ADMIN — MONTELUKAST SODIUM 10 MG: 10 TABLET, FILM COATED ORAL at 22:56

## 2022-10-13 RX ADMIN — Medication 2 PUFF: at 21:32

## 2022-10-13 RX ADMIN — FENTANYL CITRATE 25 MCG: 50 INJECTION, SOLUTION INTRAMUSCULAR; INTRAVENOUS at 08:10

## 2022-10-13 RX ADMIN — ONDANSETRON 4 MG: 2 INJECTION INTRAMUSCULAR; INTRAVENOUS at 07:32

## 2022-10-13 RX ADMIN — SODIUM CHLORIDE, POTASSIUM CHLORIDE, SODIUM LACTATE AND CALCIUM CHLORIDE: 600; 310; 30; 20 INJECTION, SOLUTION INTRAVENOUS at 08:23

## 2022-10-13 RX ADMIN — SODIUM CHLORIDE: 9 INJECTION, SOLUTION INTRAVENOUS at 11:18

## 2022-10-13 RX ADMIN — Medication 3000 MG: at 07:26

## 2022-10-13 RX ADMIN — APREPITANT 40 MG: 40 CAPSULE ORAL at 07:15

## 2022-10-13 RX ADMIN — RIVAROXABAN 20 MG: 20 TABLET, FILM COATED ORAL at 22:58

## 2022-10-13 RX ADMIN — TOPIRAMATE 150 MG: 100 TABLET, FILM COATED ORAL at 23:12

## 2022-10-13 RX ADMIN — CEFAZOLIN 2000 MG: 2 INJECTION, POWDER, FOR SOLUTION INTRAMUSCULAR; INTRAVENOUS at 17:19

## 2022-10-13 RX ADMIN — ROPIVACAINE HYDROCHLORIDE 30 ML: 5 INJECTION, SOLUTION EPIDURAL; INFILTRATION; PERINEURAL at 07:10

## 2022-10-13 RX ADMIN — ACETAMINOPHEN 1000 MG: 500 TABLET ORAL at 12:52

## 2022-10-13 RX ADMIN — ESTRADIOL 1 MG: 1 TABLET ORAL at 12:52

## 2022-10-13 RX ADMIN — FENTANYL CITRATE 25 MCG: 50 INJECTION, SOLUTION INTRAMUSCULAR; INTRAVENOUS at 07:44

## 2022-10-13 RX ADMIN — MAGNESIUM SULFATE HEPTAHYDRATE 1 G: 500 INJECTION, SOLUTION INTRAMUSCULAR; INTRAVENOUS at 07:26

## 2022-10-13 RX ADMIN — MIDAZOLAM 2 MG: 1 INJECTION INTRAMUSCULAR; INTRAVENOUS at 07:11

## 2022-10-13 RX ADMIN — TRANEXAMIC ACID 1000 MG: 1 INJECTION, SOLUTION INTRAVENOUS at 07:36

## 2022-10-13 RX ADMIN — OXYCODONE HYDROCHLORIDE 20 MG: 20 TABLET, FILM COATED, EXTENDED RELEASE ORAL at 07:16

## 2022-10-13 RX ADMIN — OXCARBAZEPINE 900 MG: 300 TABLET, FILM COATED ORAL at 22:58

## 2022-10-13 RX ADMIN — CYANOCOBALAMIN TAB 1000 MCG 1000 MCG: 1000 TAB at 12:52

## 2022-10-13 RX ADMIN — LIDOCAINE HYDROCHLORIDE 100 MG: 10 INJECTION, SOLUTION EPIDURAL; INFILTRATION; INTRACAUDAL; PERINEURAL at 08:04

## 2022-10-13 RX ADMIN — DEXAMETHASONE SODIUM PHOSPHATE 8 MG: 4 INJECTION, SOLUTION INTRAMUSCULAR; INTRAVENOUS at 07:37

## 2022-10-13 RX ADMIN — FENTANYL CITRATE 25 MCG: 0.05 INJECTION, SOLUTION INTRAMUSCULAR; INTRAVENOUS at 09:21

## 2022-10-13 RX ADMIN — GABAPENTIN 300 MG: 300 CAPSULE ORAL at 22:57

## 2022-10-13 RX ADMIN — FENTANYL CITRATE 50 MCG: 0.05 INJECTION, SOLUTION INTRAMUSCULAR; INTRAVENOUS at 10:12

## 2022-10-13 RX ADMIN — FENTANYL CITRATE 25 MCG: 50 INJECTION, SOLUTION INTRAMUSCULAR; INTRAVENOUS at 07:37

## 2022-10-13 RX ADMIN — ACETAMINOPHEN 1000 MG: 500 TABLET ORAL at 22:56

## 2022-10-13 RX ADMIN — SODIUM CHLORIDE, POTASSIUM CHLORIDE, SODIUM LACTATE AND CALCIUM CHLORIDE: 600; 310; 30; 20 INJECTION, SOLUTION INTRAVENOUS at 07:26

## 2022-10-13 RX ADMIN — METHOCARBAMOL 1000 MG: 100 INJECTION INTRAMUSCULAR; INTRAVENOUS at 07:40

## 2022-10-13 RX ADMIN — TRANEXAMIC ACID 1000 MG: 1 INJECTION, SOLUTION INTRAVENOUS at 08:09

## 2022-10-13 RX ADMIN — OXYCODONE 5 MG: 5 TABLET ORAL at 23:13

## 2022-10-13 RX ADMIN — DOCUSATE SODIUM 100 MG: 100 CAPSULE, LIQUID FILLED ORAL at 22:56

## 2022-10-13 ASSESSMENT — PAIN SCALES - GENERAL
PAINLEVEL_OUTOF10: 9
PAINLEVEL_OUTOF10: 0
PAINLEVEL_OUTOF10: 6
PAINLEVEL_OUTOF10: 8
PAINLEVEL_OUTOF10: 10
PAINLEVEL_OUTOF10: 10
PAINLEVEL_OUTOF10: 9
PAINLEVEL_OUTOF10: 8
PAINLEVEL_OUTOF10: 9
PAINLEVEL_OUTOF10: 6
PAINLEVEL_OUTOF10: 10

## 2022-10-13 ASSESSMENT — PAIN DESCRIPTION - LOCATION
LOCATION: KNEE
LOCATION: LEG
LOCATION: LEG
LOCATION: KNEE

## 2022-10-13 ASSESSMENT — PAIN DESCRIPTION - ORIENTATION
ORIENTATION: LEFT

## 2022-10-13 ASSESSMENT — PAIN DESCRIPTION - DESCRIPTORS
DESCRIPTORS: ACHING
DESCRIPTORS: SHARP;SHOOTING;SQUEEZING

## 2022-10-13 ASSESSMENT — PAIN DESCRIPTION - PAIN TYPE
TYPE: SURGICAL PAIN

## 2022-10-13 ASSESSMENT — PAIN - FUNCTIONAL ASSESSMENT: PAIN_FUNCTIONAL_ASSESSMENT: 0-10

## 2022-10-13 NOTE — ANESTHESIA PRE PROCEDURE
Department of Anesthesiology  Preprocedure Note       Name:  Candance Carpen   Age:  60 y.o.  :  1960                                          MRN:  [id number]         Date:  10/13/2022      Surgeon: Catalina Velazquez):  Saran Child MD    Procedure: Procedure(s):  LEFT TOTAL KNEE ARTHROPLASTY; ADDUCTOR BLOCK - Henery Pretty    Medications prior to admission:   Prior to Admission medications    Medication Sig Start Date End Date Taking? Authorizing Provider   calcium carbonate (OYSTER SHELL CALCIUM 500 MG) 1250 (500 Ca) MG tablet Take 1 tablet by mouth daily   Yes Historical Provider, MD   senna-docusate (PERICOLACE) 8.6-50 MG per tablet Take 1 tablet by mouth daily   Yes Historical Provider, MD   gabapentin (NEURONTIN) 300 MG capsule Take 600 mg by mouth nightly. Yes Historical Provider, MD   ferrous sulfate (IRON 325) 325 (65 Fe) MG tablet Take 325 mg by mouth daily (with breakfast) Daily every other day takes an extra one   Yes Historical Provider, MD   levothyroxine (SYNTHROID) 88 MCG tablet Take 100 mcg by mouth Daily   Yes Historical Provider, MD   rivaroxaban (XARELTO) 20 MG TABS tablet Take 20 mg by mouth   Yes Historical Provider, MD   acetaminophen (TYLENOL) 500 MG tablet Take 500 mg by mouth 650mg 2 tabs daily   Yes Historical Provider, MD   Liraglutide -Weight Management (SAXENDA SC) Inject into the skin daily   Yes Historical Provider, MD   Multiple Vitamins-Minerals (CENTRUM SILVER ADULT 50+ PO) Take by mouth 2 times daily    Historical Provider, MD   vitamin B-12 (CYANOCOBALAMIN) 1000 MCG tablet Take 1,000 mcg by mouth three times daily    Historical Provider, MD   montelukast (SINGULAIR) 10 MG tablet Take 10 mg by mouth 14   Historical Provider, MD   ADVAIR DISKUS 500-50 MCG/DOSE diskus inhaler  11/12/15   Historical Provider, MD   topiramate (TOPAMAX) 25 MG tablet 50 mg daily  11/13/15   Historical Provider, MD   fexofenadine (ALLEGRA) 180 MG tablet Take 180 mg by mouth daily. Historical Provider, MD   estradiol (ESTRACE) 1 MG tablet Take 1 mg by mouth daily. Historical Provider, MD   triamterene-hydrochlorothiazide (MAXZIDE-25) 37.5-25 MG per tablet Take 1 tablet by mouth daily Takes half a tablet    Historical Provider, MD   buPROPion (WELLBUTRIN XL) 150 MG XL tablet Take 150 mg by mouth every morning. Historical Provider, MD   topiramate (TOPAMAX) 100 MG tablet Take 100 mg by mouth daily  6/10/10   Historical Provider, MD   ALBUTEROL IN Inhale  into the lungs as needed. Historical Provider, MD   oxcarbazepine (TRILEPTAL) 300 MG tablet Take 900 mg by mouth nightly. 6/10/10   Historical Provider, MD   Cholecalciferol (VITAMIN D) 1000 UNIT TABS Take 2,000 Int'l Units by mouth daily.  4/12/10   Maryjane Gonzales MD       Current medications:    Current Facility-Administered Medications   Medication Dose Route Frequency Provider Last Rate Last Admin    lactated ringers infusion   IntraVENous Continuous Denver Lugo MD        lidocaine PF 1 % injection 0.5 mL  0.5 mL IntraDERmal Once Denver Lugo MD        tranexamic acid (CYKLOKAPRON) 1,000 mg in sodium chloride 0.9 % 60 mL IVPB  1,000 mg IntraVENous Once Denver Lugo MD        tranexamic acid (CYKLOKAPRON) 1,000 mg in sodium chloride 0.9 % 60 mL IVPB  1,000 mg IntraVENous On Call to [address] MD Durga        dexamethasone (PF) (DECADRON) injection 10 mg  10 mg IntraVENous Once Denver Lugo MD        oxyCODONE (OXYCONTIN) extended release tablet 20 mg  20 mg Oral Once Denver Lugo MD        ortho mix Adelia Lieu) injection   Injection On Call Denver uLgo MD        ceFAZolin (ANCEF) 3000 mg in dextrose 5 % 100 mL IVPB  3,000 mg IntraVENous On Call to [address] MD Durga        midazolam PF (VERSED) injection 2 mg  2 mg IntraVENous Once PRN Devora Eid MD        aprepitant (EMEND) capsule 40 mg  40 mg Oral Once Mario Caro MD Marline           Allergies:     Allergies   Allergen Reactions    Codeine Nausea Only     Can take if gets phenergan 1st    Lisinopril Other (See Comments)     cough       Problem List:    Patient Active Problem List   Diagnosis Code    Arthritis M19.90    Asthma J45.909    Essential hypertension I10    Vitamin D deficiency E55.9    Bipolar disorder ([facility] [address] F31.9    Status post total right knee replacement using cement 2011 Z96.651    Vitamin B12 deficiency E53.8    Weight gain R63.5    Morbid obesity with BMI of 60.0-69.9, adult ([facility] [address] E66.01, Z68.44    S/P right knee arthroscopy, chondroplasty, osteochondral picking of lateral condyle, synovectomy and partial medial meniscectomy 11/10/1998 Z98.890    Right hip pain M25.551    Left hip pain M25.552    Primary osteoarthritis of left knee M17.12    Chronic pain of left knee M25.562, G89.29    Midline low back pain with sciatica M54.40    Greater trochanteric bursitis M70.60       Past Medical History:        Diagnosis Date    Anesthesia     slow to wake    Asthma     At risk for falling     Wears brace and takes mood alternating,  and hypertensive meds    Bipolar Disorder     Cancer ([facility] [address]     skin    Chronic low back pain     Degenerative joint disease     Fibromyalgia     Hypertension     Kidney stone 2013    lithotripsy done    Numbness of feet     states it is sciatica    Obesity     CRIS on CPAP     Osteoarthritis     Reflux     RLS (restless legs syndrome)     Wears glasses        Past Surgical History:        Procedure Laterality Date    APPENDECTOMY      BLADDER SUSPENSION      BREAST BIOPSY      Left    BREAST SURGERY      lumpectomy-benign     SECTION      3-live births    CHOLECYSTECTOMY      HYSTERECTOMY (CERVIX STATUS UNKNOWN)  2003    JOINT REPLACEMENT Right     knee    KNEE ARTHROPLASTY  6/15/2011    right    KNEE ARTHROSCOPY      right    LAP BAND  08    OTHER SURGICAL HISTORY  14    LAPAROSCOPIC ADJUSTABLE GASTRIC BAND REMOVAL    SKIN CANCER EXCISION         Social History:    Social History     Tobacco Use    Smoking status: Former     Packs/day: 2.00     Years: 35.00     Pack years: 70.00     Types: Cigarettes     Quit date: 2008     Years since quittin.9    Smokeless tobacco: Never   Substance Use Topics    Alcohol use: Not Currently     Comment: occ. Counseling given: Not Answered      Vital Signs (Current):   Vitals:    22 1408   Weight: 224 lb (101.6 kg)   Height: 5' 3\" (1.6 m)                                              BP Readings from Last 3 Encounters:   17 (!) 142/80   17 128/72   17 136/86       NPO Status: Time of last liquid consumption:                         Time of last solid consumption:                         Date of last liquid consumption: 10/12/22                        Date of last solid food consumption: 10/12/22    BMI:   Wt Readings from Last 3 Encounters:   22 224 lb (101.6 kg)   19 227 lb (103 kg)   17 (!) 347 lb (157.4 kg)     Body mass index is 39.68 kg/m².     CBC:   Lab Results   Component Value Date/Time    WBC 3.5 2022 01:00 PM    RBC 4.07 2022 01:00 PM    HGB 13.0 2022 01:00 PM    HCT 38.7 2022 01:00 PM    MCV 95.2 2022 01:00 PM    RDW 14.0 2022 01:00 PM     2022 01:00 PM       CMP:   Lab Results   Component Value Date/Time     2022 01:00 PM    K 4.0 2022 01:00 PM     2022 01:00 PM    CO2 24 2022 01:00 PM    BUN 13 2022 01:00 PM    CREATININE 0.6 2022 01:00 PM    GFRAA >60 2022 01:00 PM    GFRAA >60 2012 10:25 PM    AGRATIO 1.5 2022 01:00 PM    LABGLOM >60 2022 01:00 PM    GLUCOSE 83 2022 01:00 PM    PROT 6.8 2022 01:00 PM    PROT 7.1 2010 10:58 AM    CALCIUM 9.2 2022 01:00 PM    BILITOT <0.2 09/19/2022 01:00 PM    ALKPHOS 94 09/19/2022 01:00 PM    AST 32 09/19/2022 01:00 PM    ALT 13 09/19/2022 01:00 PM       POC Tests: No results for input(s): POCGLU, POCNA, POCK, POCCL, POCBUN, POCHEMO, POCHCT in the last 72 hours. Coags:   Lab Results   Component Value Date/Time    PROTIME 17.7 09/19/2022 01:00 PM    INR 1.47 09/19/2022 01:00 PM    APTT 45.6 09/19/2022 01:00 PM       HCG (If Applicable): No results found for: PREGTESTUR, PREGSERUM, HCG, HCGQUANT     ABGs: No results found for: PHART, PO2ART, QPJ5JWS, JSI1UZY, BEART, J1METRPG     Type & Screen (If Applicable):  Lab Results   Component Value Date    LABABO O 06/15/2011    [address] Positive 06/15/2011       Drug/Infectious Status (If Applicable):  No results found for: HIV, HEPCAB    COVID-19 Screening (If Applicable): No results found for: COVID19        Anesthesia Evaluation  Patient summary reviewed   history of anesthetic complications (slow to emerge): PONV. Airway: Mallampati: II  TM distance: <3 FB   Neck ROM: full  Mouth opening: > = 3 FB   Dental: normal exam         Pulmonary:   (+) sleep apnea: on CPAP,  asthma:                            Cardiovascular:    (+) hypertension:, pacemaker: pacemaker,                   Neuro/Psych:   (+) neuromuscular disease:, psychiatric history:            GI/Hepatic/Renal: Neg GI/Hepatic/Renal ROS            Endo/Other:    (+) : arthritis:., .    (-) diabetes mellitus               Abdominal:   (+) obese,           Vascular: Other Findings:           Anesthesia Plan      general and regional     ASA 3     (Pt refuses spinal.)  Induction: intravenous. MIPS: Postoperative opioids intended and Prophylactic antiemetics administered. Anesthetic plan and risks discussed with patient. Plan discussed with CRNA.                     Allison Cardona MD   10/13/2022

## 2022-10-13 NOTE — OP NOTE
Operative Note      Patient: Jael Sims  YOB: 1960  MRN: [id number]    Date of Procedure: 10/13/2022    Pre-Op Diagnosis: Primary osteoarthritis of one knee, left [M17.12]    Post-Op Diagnosis: Same       Procedure(s):  LEFT TOTAL KNEE ARTHROPLASTY; ADDUCTOR BLOCK - Macho Murrell    Surgeon(s):  Rosana Faith MD    Assistant:   Surgical Assistant: Anthony Cuenca  Physician Assistant: Faye Fontaine    Anesthesia: General    Estimated Blood Loss (mL): Minimal    Complications: None    Specimens:   * No specimens in log *    Implants:  Implant Name Type Inv. Item Serial No.  Lot No. LRB No. Used Action   CEMENT BNE 40GM HI VISC RADPQ FOR REV SURG - [id number]  CEMENT BNE 40GM HI VISC RADPQ FOR REV SURG  [employer]- [id number] Left 1 Implanted   CEMENT BNE 40GM HI VISC RADPQ FOR REV SURG - [id number]  CEMENT BNE 40GM HI VISC RADPQ FOR REV SURG  [employer]- [id number] Left 1 Implanted   COMPONENT FEM SZ 8 L KNEE CO CHROM SHERITA CRUCE RET DENIS - [id number]  COMPONENT FEM SZ 8 L KNEE CO CHROM SHERITA CRUCE RET DENIS  [employer]- [id number] Left 1 Implanted   COMPONENT PAT FXK31OM THK9.5MM STD VIVACIT-E DENIS INSET FOR - [id number]  COMPONENT PAT VSU67IB THK9.5MM STD VIVACIT-E DENIS INSET FOR  [employer]- [id number] Left 1 Implanted   EXTENSION STEM SZ E 5DEG L TIBL KNEE DENIS THIERNO TIB PERSONA - [id number]  EXTENSION STEM SZ E 5DEG L TIBL KNEE DENIS THIERNO TIB PERSONA  [employer]- [id number] Left 1 Implanted   PSN MC VE ASF L 12MM 8-11 EF - [id number]  PSN MC VE ASF L 12MM 8-11 EF  [employer]- [id number] Left 1 Implanted         Drains: * No LDAs found *    Findings: OA    Detailed Description of Procedure:   Operative Report    Patient: Jael Sims  YOB: 1960  Age: 60 y.o.   Sex: female  MRN: [id number]    DATE OF OPERATION : 10/13/2022     SURGEON: Rosana Faith MD     ASSISTANT: Irma Conte is a board certified physician assistant who is medically necessary as a first assistant in the operating room with me. He is responsible for assisting with positioning of the patient,retraction,cauterization and manipulation of the involved extremity to allow for improved visualization throughout the surgical procedure facilitating optimal placement of the implants providing a stable, functional joint replacement. His presence in the operating room with me as a first assistant during joint replacement procedures enables me to perform the surgical procedure in a more timely and efficient manner. The [facility] does not provide me with a first assistant in the operating room who has the same surgical skills as my physician assistant. PREOPERATIVE DIAGNOSIS: Left knee arthritis     POSTOPERATIVE DIAGNOSIS: Left knee arthritis      OPERATIVE PROCEDURE:  Left total knee arthroplasty     COMPLICATIONS: none     CONSULTATIONS: none     ANESTHESIA: GETA    Pre op Antibiotics: Ancef 2 gms IV    EBL: less than 50ml     INDICATIONS FOR SURGERY:   Sharon Maradiaga, is a 60 y.o. female patient with osteoarthritis of the Left knee . The patient has failed exhaustive conservative treatment consisting on NSAIDS, cortisone, visco supplementation and physical therapy. The patient complains of 8/10 knee pain that increases with weightbearing activities. They have limited ROM. Their pain interferes with their activities of daily living and other activities outside the house. They ambulate with a cane outside the house. Their xrays demonstrated end stage arthritis manifested by loss of joint space, osteophyte formation and subchondral sclerosis. Because of their worsening situation in spite of anti-inflammatory medications and corticosteroid injections, the risks and benefits of Left total knee replacement were explained to them in great detail and they elected to proceed with surgery.      OPERATIVE FINDINGS: There was severe osteophyte formation throughout all  three compartments of the knee. IMPLANTS USED:  Chantal size 8  Personna femur, a size E  tibial   tray, a 38 mm patella, a 12 mm MC polyethylene spacer     DETAILS OF SURGERY: The patient was brought to the operating room and  placed supine on the operating room table. A nonsterile tourniquet was applied to the patients Left thigh. The  Left lower extremity was prepped and draped in the usual sterile fashion. After careful skin marking, A standard midline incision was made. The skin and subcutaneoustissue were sharply incised. A medial parapatellar approach was performed. The knee was extended, and the patella was everted. Remnants of the medial and lateral menisci, fat pad and anterior cruciate ligament were removed. The posterior knee retractor was placed, as were Z-retractors to protect  the medial and lateral collateral ligaments respectively. Extramedullary  tibial alignment guide was centered over the tibial shaft, maintaining 7  degrees of posterior slope, approximately 10 mm was resected off the  proximal tibia. Attention was then turned to the femur, where a drill was placed into the  intramedullary canal. The intramedullary alignment guide was set at 4  degrees of valgus. The distal cutting reference guide was applied, and a  10 mm block was removed from the distal femur. Maintaining Whitesides  line, epicondylar access, and posterior condylar referencing, three degrees  of external rotation was established. The 4-in-1 cutting block was  applied, and all cuts were made. Attention was returned to  the tibia, which was prepared with a drill and broach. A reduction was  performed, and there was found to be tremendous stability through all range  of motion with a  12 mm polyethylene spacer. Patella measured to a  thickness of  20 mm, and was resected to a thickness of  12 mm, and prepared  for a 38 mm patellar button.  The patellar button was found to track in the  midline. The components were removed, and using meticulous third generation  cementing techniques, the tibia, femur, and patella were cemented in place. The cement was allowed to set. Trial reductions were again performed. A  12  mm spacer block gave overall best fit and fill. The final spacer block  was applied. The wound was irrigated with copious amounts of normal  saline. The medial parapatellar approach was closed with #1 Ethibond suture. The skin was closed with O vicryl and  2-0 interrupted Vicryl sutures followed by dermabond glue. A dry sterile dressing was applied. The patient was awakened and taken stable to the recovery room for  postoperative care and pain management. The patient is to remain in Orthopedic  service pending stabilization with probable discharge to home over the next  2-3 days' time.         ELECTRONICALLY SIGNED BY: Saran Child MD, 10/13/2022     Electronically signed by Saran Child MD on 10/13/2022 at 10:54 AM

## 2022-10-13 NOTE — PROGRESS NOTES
Incentive Spirometry education and demonstration completed by Respiratory Therapy Yes      Response to education: Excellent     Teaching Time: 5 minutes    Minimum Predicted Vital Capacity - 524 mL. Patient's Actual Vital Capacity - 1500 mL. Turning over to Nursing for routine follow-up Yes.        Electronically signed by Mariana Chris RCP on 10/13/2022 at 2:39 PM

## 2022-10-13 NOTE — PROGRESS NOTES
[facility] [address]   Phone: [phone]    Occupational Therapy    [x] Initial Evaluation            [] Daily Treatment Note         [] Discharge Summary      Patient: Maksim Wallace   : [age]   MRN: [id number]   Date of Service:  10/13/2022    Admitting Diagnosis:  Arthritis of left knee  Current Admission Summary: Pt is s/p L TKA on 10/13/22 by Dr. Ayesha Hayward. Past Medical History:  has a past medical history of Anesthesia, Asthma, At risk for falling, Bipolar Disorder, Cancer ([facility] [address] Chronic low back pain, Degenerative joint disease, Fibromyalgia, Hypertension, Kidney stone, Numbness of feet, Obesity, CRIS on CPAP, Osteoarthritis, Reflux, RLS (restless legs syndrome), and Wears glasses. Past Surgical History:  has a past surgical history that includes Hysterectomy (); Cholecystectomy ();  section; Lap Band (08); Appendectomy (); bladder suspension; Knee arthroscopy; Skin cancer excision; Knee Arthroplasty (6/15/2011); other surgical history (14); Breast surgery (); Breast biopsy; joint replacement (Right); and Total knee arthroplasty (Left, 10/13/2022). Discharge Recommendations: Maksim Wallace scored a 13/24 on the AM-PAC ADL Inpatient form. Current research shows that an AM-PAC score of 18 or greater is typically associated with a discharge to the patient's home setting. Based on the patient's AM-PAC score, and their current ADL deficits, it is recommended that the patient have 2-3 sessions per week of Occupational Therapy at d/c to increase the patient's independence. At this time, this patient demonstrates the endurance and safety to discharge home with home services and a follow up treatment frequency of 2-3x/wk. Please see assessment section for further patient specific details. [facility] score very low d/t pt's LOC on toilet.  It is expected that she will be able to perform ADL activities with less assistance tomorrow. HOME HEALTH CARE: LEVEL 3 SAFETY     - Initial home health evaluation to occur within 24-48 hours, in patient home   - Therapy evaluations in home within 24-48 hours of discharge; including DME and home safety   - Frontload therapy 5 days, then 3x a week   - Therapy to evaluate if patient has [address] needs for personal care   -  evaluation within 24-48 hours, includes evaluation of resources and insurance to determine [place], [place], LTC, and Medicaid options     If patient discharges prior to next session this note will serve as a discharge summary. Please see below for the latest assessment towards goals. DME Required For Discharge: rolling walker, tub transfer bench, hand-held shower head, grab bars - shower, reacher    Precautions/Restrictions: high fall risk  Weight Bearing Restrictions: weight bearing as tolerated  [] Right Upper Extremity  [] Left Upper Extremity [] Right Lower Extremity  [x] Left Lower Extremity     Required Braces/Orthotics: no braces required   [] Right  [] Left  Positional Restrictions:no positional restrictions    Pre-Admission Information   Lives With: spouse    Type of Home: house  Home Layout: two level, 5 steps + 10 to 2nd floor  Home Access:  1 step to enter without rails   Bathroom Layout:  Remodeling downstairs to walk-in shower; tub shower upstairs  Bathroom Equipment:  None  Toilet Height: standard height  Home Equipment: no prior equipment  Transfer Assistance: Independent without use of device  Ambulation Assistance:Independent without use of device  ADL Assistance: independent with all ADL's  IADL Assistance: independent with homemaking tasks  Active :        [x] Yes  [] No  Hand Dominance: [] Left  [] Right  Current Employment: retired. Occupation: RN  Hobbies:   Recent Falls: 1 recent fall; tripped 4 mos ago. Comment: Pt and spouse having entire basement and 1st floor remodeled.  No furniture currently in main level of home.    Examination   Vision:   Vision Gross Assessment: WFL  Hearing:   WFL  Perception:   WFL  Sensation:   WFL  Proprioception:    WFL  Tone:   Normotonic  Coordination Testing:   WFL    ROM:   (B) UE AROM WFL  Strength:   (B) UE strength grossly WFL    Decision Making: medium complexity  Clinical Presentation: unstable      Subjective  General: Pt supine in bed with  present. Pt pleasant and agreeable to OT eval.   Pain: 0/10  Pain Interventions: not applicable        Activities of Daily Living  Basic Activities of Daily Living  Toileting: stand by assistance Comment: pt performed khurram hygiene seated on toilet; did not perform clothing mgt. General Comments: Not assessed d/t pt with LOC on toilet d/t BP drop  Instrumental Activities of Daily Living  No IADL completed on this date. Functional Mobility  Bed Mobility  Supine to Sit: stand by assistance, HOB elevated  Sit to Supine: dependent assistance, via liliane lift d/t LOC on toilet  Comments:  Transfers  Sit to stand transfer:dependent assistance, from toilet; Min A from EOB  Stand to sit transfer: dependent assistance, liliane lift to EOB; CGA to tolet  Stand step transfer: contact guard assistance, w/RW  Toilet transfer: contact guard assistance, w/RW  Toilet transfer equipment: standard toilet, grab bars, walker  Comments: Pt required Maxi Move lift for transfer from toilet to bed d/t BP drop  Functional Mobility:  Sitting Balance: dependent assistance, SBA progressing to Dependent on toilet d/t BP drop. Standing Balance Comment: CGA w/RW  Functional Mobility Activity: to/from bathroom  Functional Mobility Device Use: rolling walker    Comment: While able to ambulate to the bathroom w/RW and CGA, pt experienced LOC on toilet with BP drop, requiring Maxi Move lfit to transfer to EOB.     Functional Outcomes  AM-PAC Inpatient Daily Activity Raw Score: 13    Cognition  WFL  Orientation:    alert and oriented x 4  Command Following:   WFL (until pt experienced LOC)     Education  Barriers To Learning: none  Patient Education: patient educated on OT role and benefits, plan of care, family education, transfer training, discharge recommendations  Learning Assessment:  patient verbalizes understanding, would benefit from continued reinforcement    Assessment  Activity Tolerance: Pt initially tolerated session well, sitting on EOB and ambulating to bathroom with CGA using RW. While urinating in toilet, pt complained of feeling nauseous, then chilled. Pt experienced LOC seated on toilet. Staff arrived to assist, and Rapid Response called. Maxi Gurpreet used to transfer to bed, and pt with improved consciousness. BP initially WNL prior to ambulation. BP dropped to 96Z systolic seated on toilet. In bed,  systolic, then 074/35. Pt speaking and interacting  as prior to incident. Impairments Requiring Therapeutic Intervention: decreased functional mobility, decreased ADL status, decreased endurance  Prognosis: good  Clinical Assessment: Pt is below her baseline level of occupational function, based on the above deficits associated with L TKA. Pt required CGA for functional mobility to bathroom for toilet transfer and toileting. Unable to further assess d/t LOC on toilet and Maxi Move transfer to bed. It is expected that pt will perform ADLs and functional mobility with less assistance tomorrow. She would benefit from continued skilled acute OT services to address these deficits and maximize her safety and independence with ADLs and functional mobility. Safety Interventions: patient left in bed, bed alarm in place, call light within reach, gait belt, patient at risk for falls, nurse notified, family/caregiver present, and Nursing staff in room.     Plan  Frequency: 7 x/week  Current Treatment Recommendations: functional mobility training, transfer training, endurance training, ADL/self-care training, safety education, and equipment evaluation/education    Goals  Patient Goals: To return home   Short Term Goals:  Time Frame: Discharge  Patient will complete upper body ADL at set up assistance   Patient will complete lower body ADL at supervision   Patient will complete toileting at supervision   Patient will complete grooming at supervision, in stance at sink   Patient will complete functional transfers at supervision   Patient will complete functional mobility at supervision, w/RW   Patient to maintain standing at supervision for 5-7 minutes.     Therapy Session Time     Individual Group Co-treatment   Time In    8317   Time Out    1244   Minutes    39        Timed Code Treatment Minutes:   24 min  Total Treatment Minutes:  39 min       Electronically Signed By: Marvelyn Gaucher, OT Sal Medin., OTR/L, [id number]

## 2022-10-13 NOTE — PROGRESS NOTES
Pt sleeping in bed with RR >10. VSS- on 4L NC satting mid to high 90s. Dressing to L knee CDI with no drainage. Neurovascular checks WNL at this time with pedal pulse palpable. Xray normal. Pt denies nausea.  sent up to room.  Phase one criteria met, will transfer to [facility]

## 2022-10-13 NOTE — PROGRESS NOTES
Rapid response called apprx 12:15, patient had a syncopal episode while working with PT in the restroom. Staff members, Wilfrid Naqvi. Dr. Reji Monreal. Bam RN. at bedside. Patient was on the toilet, no injuries. BP 82/52, 500 LR bolus given, 100/66, HR 66, , 93 RA. 0.2 mg Narcan give IV. Pt returned to bed via liliane lift. Alert, Oriented, vitals stable. Spouse at bedside.

## 2022-10-13 NOTE — H&P
Date of Surgery Update:      Kenyetta Keen  was seen, history and physical examination reviewed, and patient examined by me today. There have been no significant clinical changes since the completion of the previous history and physical.    The patient and I discussed that this is an elective procedure/surgery. We discussed the risks of the procedure/surgery, including but not limited to what is outlined in the signed informed consent. We also discussed the risk of pacheco COVID 19 while in the facility. We discussed the increased risk of a bad outcome should the patient contract COVID 19 during the post-procedural/post-operative period, given the patients current health condition, chronic conditions, and the added risk of COVID 19 in light of these conditions. The patient and I also discussed the risk of further postponing the procedure/surgery and other treatment alternatives, including non-procedural/surgical treatments. The risk, benefits, and alternatives of the proposed procedure have been explained to the patient (or appropriate guardian) and understanding verbalized. All questions answered. Patient wishes to proceed.         Electronically signed by: John Clemente MD,10/13/2022,6:58 AM

## 2022-10-13 NOTE — ANESTHESIA PROCEDURE NOTES
Peripheral Block    Patient location during procedure: holding area  Reason for block: post-op pain management and at surgeon's request  Start time: 10/13/2022 7:10 AM  End time: 10/13/2022 7:15 AM  Staffing  Performed: anesthesiologist   Anesthesiologist: Pat Cooney MD  Preanesthetic Checklist  Completed: patient identified, IV checked, site marked, risks and benefits discussed, surgical/procedural consents, equipment checked, pre-op evaluation, timeout performed, anesthesia consent given, oxygen available and monitors applied/VS acknowledged  Peripheral Block   Patient position: supine  Prep: ChloraPrep  Provider prep: mask and sterile gloves  Patient monitoring: cardiac monitor, continuous pulse ox, continuous capnometry, frequent blood pressure checks and IV access  Block type: Femoral  Adductor canal  Laterality: left  Injection technique: single-shot  Guidance: ultrasound guided    Needle   Needle type: combined needle/nerve stimulator   Needle gauge: 22 G  Needle localization: anatomical landmarks and ultrasound guidance  Needle length: 10 cm  Assessment   Injection assessment: negative aspiration for heme, no paresthesia on injection and local visualized surrounding nerve on ultrasound  Paresthesia pain: none  Slow fractionated injection: yes  Hemodynamics: stable  Real-time US image taken/store: yes    Medications Administered  ropivacaine (NAROPIN) injection 0.5% - Perineural   30 mL - 10/13/2022 7:10:00 AM

## 2022-10-13 NOTE — PROGRESS NOTES
Nursing care provided to prep patient for surgery. Pre-op orders completed. Verified patient has completed 5 days of CHG pre-op showers. Pneumatic sleeves and compression stockings applied as ordered. Teaching / education initiated regarding perioperative experience, post-op expectations, plan of care, and pain management during hospital stay. Patient verbalized understanding. Pain Goal Expectations 5/10. The care plan and education has been reviewed and mutually agreed upon with the patient.

## 2022-10-13 NOTE — PROGRESS NOTES
Rapid Response Quick Summary    Room: [address] [place], [place] [address]    Assessment of concern / patient:  Patient with possible syncopal episode on toilet. Lift used to return patient to bed. Patient remained drowsy with low oxygen saturation. Physician involved:  Dr. Brayan Ulloa    Interventions:  0.2 of Narcan and  ml fluid bolus     Disposition:  Patient woke up rapidly after administration of narcan. Patient alert and oriented. Vital signs stable. Oxygen removed. Dr. Brayan Ulloa reviewed MAR.

## 2022-10-13 NOTE — SIGNIFICANT EVENT
SIGNIFICANT EVENT:  RAPID RESPONSE    Name:  Abad Hylton    /Age/Sex:   (60 y.o. female)  MRN & CSN:  [id number] & [id number]    SUBJECTIVE:     RAPID RESPONSE was called for Abad Hylton in regards to syncope. Patient is a 60 y.o. female admitted at [facility] for Arthritis of left knee. Patient seen and examined in [facility]3658/[id number]. Patient was working with therapy when she had a syncopal episode on the toilet. She was urinating at the time. She passed out, but did not hit her head. She received doses of fentanyl during her surgery as well as 10 mg oxycodone an hour prior to this event. Patient was transitioned over to the bed and was given Narcan and awoke out of her stupor. She says this is happened to her before when she is received too many medicines or undergone anesthesia in the past.    Blood pressure was 54Q systolic in the bathroom, but 110s after the incident. OBJECTIVE:     VITALS  Vitals:    10/13/22 1057 10/13/22 1106 10/13/22 1202 10/13/22 1215   BP: 134/72   (!) 82/52   Pulse: 67   66   Resp:       Temp: 97.7 °F (36.5 °C)      TempSrc: Oral   Oral   SpO2: 98% 97% 94%    Weight:       Height:                     PHYSICAL EXAM - pre narcan  GEN Awake female, sleepy   EYES Pinpoint pupils. No scleral erythema or discharge. HENT Mucous membranes are moist. Pharynx without exudates or thrush. NECK Supple, no apparent thyromegaly or masses. RESP CTAB. Symmetric chest movement. CARDIO/VASC S1/S2. RRR. No JVD. Pulses equal & b/l. No peripheral edema. HEME/LYMPH No bruising, lymphadenopathy, or hepatosplenomagly. MSK No gross joint deformities. L knee s/p surgery   SKIN Normal coloration, warm, & dry. NEURO Minimally responsive. PSYCH Awake. Minimally responsive. PHYSICAL EXAM - post narcan  GEN Awake female, cooperative. No distress. EYES PERRLA. No scleral erythema or discharge.    HENT Mucous membranes are moist. Pharynx without exudates or thrush. NECK Supple, no apparent thyromegaly or masses. RESP CTAB. Symmetric chest movement. CARDIO/VASC S1/S2. RRR. No JVD. Pulses equal & b/l. No peripheral edema. GI Soft. No TTP in all quadrants. BS +. HEME/LYMPH No bruising, lymphadenopathy, or hepatosplenomagly. MSK No gross joint deformities. L knee s/p surgery   SKIN Normal coloration, warm, & dry. NEURO CNs grossly intact, normal speech, no lateralizing weakness. PSYCH Awake, alert, oriented x 4. Affect appropriate. ASSESSMENT/PLAN:    1.  Syncope  -Possibly 2/2 vasovagal, more likely opioid overdose unintentionally  -Narcan 0.4 mg awoke patient  -500 cc bolus LR  -EKG, labs ordered  -Recommend not giving 10 mg oxycodone again, okay with 5 mg    Donnalsandy Colvin, DO 10/13/2022 12:32 PM

## 2022-10-13 NOTE — RT PROTOCOL NOTE
RT Inhaler-Nebulizer Bronchodilator Protocol Note    There is a bronchodilator order in the chart from a provider indicating to follow the RT Bronchodilator Protocol and there is an Initiate RT Inhaler-Nebulizer Bronchodilator Protocol order as well (see protocol at bottom of note). CXR Findings:  No results found. The findings from the last RT Protocol Assessment were as follows:   History Pulmonary Disease: Chronic pulmonary disease  Respiratory Pattern: Regular pattern and RR 12-20 bpm  Breath Sounds: Clear breath sounds  Cough: Strong, spontaneous, non-productive  Indication for Bronchodilator Therapy:    Bronchodilator Assessment Score: 2    Aerosolized bronchodilator medication orders have been revised according to the RT Inhaler-Nebulizer Bronchodilator Protocol below. Respiratory Therapist to perform RT Therapy Protocol Assessment initially then follow the protocol. Repeat RT Therapy Protocol Assessment PRN for score 0-3 or on second treatment, BID, and PRN for scores above 3. No Indications - adjust the frequency to every 6 hours PRN wheezing or bronchospasm, if no treatments needed after 48 hours then discontinue using Per Protocol order mode. If indication present, adjust the RT bronchodilator orders based on the Bronchodilator Assessment Score as indicated below. Use Inhaler orders unless patient has one or more of the following: on home nebulizer, not able to hold breath for 10 seconds, is not alert and oriented, cannot activate and use MDI correctly, or respiratory rate 25 breaths per minute or more, then use the equivalent nebulizer order(s) with same Frequency and PRN reasons based on the score. If a patient is on this medication at home then do not decrease Frequency below that used at home.     0-3 - enter or revise RT bronchodilator order(s) to equivalent RT Bronchodilator order with Frequency of every 4 hours PRN for wheezing or increased work of breathing using Per Protocol order mode. 4-6 - enter or revise RT Bronchodilator order(s) to two equivalent RT bronchodilator orders with one order with BID Frequency and one order with Frequency of every 4 hours PRN wheezing or increased work of breathing using Per Protocol order mode. 7-10 - enter or revise RT Bronchodilator order(s) to two equivalent RT bronchodilator orders with one order with TID Frequency and one order with Frequency of every 4 hours PRN wheezing or increased work of breathing using Per Protocol order mode. 11-13 - enter or revise RT Bronchodilator order(s) to one equivalent RT bronchodilator order with QID Frequency and an Albuterol order with Frequency of every 4 hours PRN wheezing or increased work of breathing using Per Protocol order mode. Greater than 13 - enter or revise RT Bronchodilator order(s) to one equivalent RT bronchodilator order with every 4 hours Frequency and an Albuterol order with Frequency of every 2 hours PRN wheezing or increased work of breathing using Per Protocol order mode. RT to enter RT Home Evaluation for COPD & MDI Assessment order using Per Protocol order mode.     Electronically signed by Brianna Trevizo RCP on 10/13/2022 at 7:01 PM

## 2022-10-13 NOTE — PROGRESS NOTES
[facility] [address]   Phone: [phone]    Physical Therapy    [x] Initial Evaluation            [] Daily Treatment Note         [] Discharge Summary      Patient: Aurelio Christensen   :    MRN: [id number]   Date of Service:  10/13/2022  Admitting Diagnosis: Arthritis of left knee  Current Admission Summary:  Pt is s/p L TKA on 10/13/22 by Dr. Cecilia Cristina. Past Medical History:  has a past medical history of Anesthesia, Asthma, At risk for falling, Bipolar Disorder, Cancer ([facility] [address] Chronic low back pain, Degenerative joint disease, Fibromyalgia, Hypertension, Kidney stone, Numbness of feet, Obesity, CRIS on CPAP, Osteoarthritis, Reflux, RLS (restless legs syndrome), and Wears glasses. Past Surgical History:  has a past surgical history that includes Hysterectomy (); Cholecystectomy ();  section; Lap Band (08); Appendectomy (); bladder suspension; Knee arthroscopy; Skin cancer excision; Knee Arthroplasty (6/15/2011); other surgical history (14); Breast surgery (); Breast biopsy; joint replacement (Right); and Total knee arthroplasty (Left, 10/13/2022). Discharge Recommendations: Aurelio Christensen scored a 17/24 on the AM-PAC short mobility form. Current research shows that an AM-PAC score of 18 or greater is typically associated with a discharge to the patient's home setting. Based on the patient's AM-PAC score and their current functional mobility deficits, it is recommended that the patient have 2-3 sessions per week of Physical Therapy at d/c to increase the patient's independence. At this time, this patient demonstrates the endurance and safety to discharge home with home services and a follow up treatment frequency of 2-3x/wk. Please see assessment section for further patient specific details. If patient discharges prior to next session this note will serve as a discharge summary.   Please see below for the latest assessment towards goals. [facility] score is written based off of performance prior to LOC while on toilet. HOME HEALTH CARE: LEVEL 3 SAFETY     - Initial home health evaluation to occur within 24-48 hours, in patient home   - Therapy evaluations in home within 24-48 hours of discharge; including DME and home safety   - Frontload therapy 5 days, then 3x a week   - Therapy to evaluate if patient has [address] needs for personal care   -  evaluation within 24-48 hours, includes evaluation of resources and insurance to determine [place], [place], LTC, and Medicaid options     DME Required For Discharge: rolling walker, tub transfer bench, hand-held shower head, grab bars - shower, reacher  Precautions/Restrictions: high fall risk due to LOC episode  Weight Bearing Restrictions: weight bearing as tolerated  [] Right Upper Extremity  [] Left Upper Extremity [] Right Lower Extremity  [x] Left Lower Extremity     Required Braces/Orthotics: no braces required   [] Right  [] Left  Positional Restrictions:no positional restrictions    Pre-Admission Information   Lives With: spouse                  Type of Home: house  Home Layout: two level, 5 steps + 10 to 2nd floor  Home Access:  1 step to enter without rails   Bathroom Layout:  Remodeling downstairs to walk-in shower; tub shower upstairs  Bathroom Equipment:  None  Toilet Height: standard height  Home Equipment: no prior equipment  Transfer Assistance: Independent without use of device  Ambulation Assistance:Independent without use of device  ADL Assistance: independent with all ADL's  IADL Assistance: independent with homemaking tasks  Active :        [x] Yes                 [] No  Hand Dominance: [] Left                 [] Right  Current Employment: retired. Occupation: RN  Hobbies:   Recent Falls: 1 recent fall; tripped 4 mos ago. Comment: Pt and spouse having entire basement and 1st floor remodeled.  No furniture currently in main level of home.     Examination   Vision:   Vision Gross Assessment: WFL  Hearing:   WFL  Sensation:   WFL  Proprioception:    WFL  Tone:   Normotonic  Coordination Testing:   WFL    ROM:   (L) Knee: lacking ~10 degrees extension, 85 degrees flexion     (R) Knee: WFL  Strength:   (B) LE strength grossly WFL  Decision Making: medium complexity  Clinical Presentation: unstable--LOC on toilet      Subjective  General: Pt supine in bed with  present. Pt pleasant and agreeable to OT eval.   Pain: 0/10  Pain Interventions: not applicable       Functional Mobility  Bed Mobility  Supine to Sit: contact guard assistance, with use of HR  Sit to Supine: dependent assistance due to LOC patient returned to bed with maxi-move  Comments:  Transfers  Sit to stand transfer: minimal assistance  Stand to sit transfer: contact guard assistance  Toilet transfer: contact guard assistance, with use of grab bar  Comments: transfers completed up to RW  Once experiencing LOC patient dependent from transfer seated on toilet to supine in bed  Ambulation  Surface:level surface  Assistive Device: rolling walker  Assistance: contact guard assistance  Distance: 15  Gait Mechanics: decreased weight bearing onto (L) hip, no evidence of knee buckling, heavy WB through UEs  Comments:    Stair Mobility  Stair mobility not completed on this date. Comments: patient experiencing LOC on toilet  Wheelchair Mobility:  No w/c mobility completed on this date.   Comments:  Balance  Static Sitting Balance: fair (+): maintains balance at SBA/supervision without use of UE support  Dynamic Sitting Balance: fair (+): maintains balance at SBA/supervision without use of UE support  Static Standing Balance: fair (-): maintains balance at CGA with use of UE support  Dynamic Standing Balance: fair (-): maintains balance at CGA with use of UE support  Comments:    Other Therapeutic Interventions    While able to ambulate to the bathroom w/RW and CGA, pt experienced LOC on toilet with BP drop, requiring Maxi Move lift to transfer to EOB. Functional Outcomes  AM-PAC Inpatient Mobility Raw Score : 17              Cognition  WFL  Orientation:    alert and oriented x 4  Command Following:   [facility]  Barriers To Learning: none  Patient Education: patient educated on goals, PT role and benefits, plan of care, general safety, functional mobility training, proper use of assistive device/equipment, transfer training, discharge recommendations  Learning Assessment:  patient verbalizes and demonstrates understanding    Assessment  Activity Tolerance: BP limiting during session. At beginning of session while standing BP reads 136/84. Once experiencing LOC on toilet BP reads 80/52 and once supine in bed 100/66. Impairments Requiring Therapeutic Intervention: decreased functional mobility, decreased strength, decreased endurance, decreased balance  Prognosis: good  Clinical Assessment: Patient is a 59 yo female presenting s/p (L) TKA and tolerates therapy evaluations fair this date. Patient is able to mobilize with CGA until episode of LOC where patient is dependent to safely assist with transfer to bed. At this time patient presents safe to return to the home setting with use of RW and spouse support once medically appropriate and able to tolerate time in upright positioning. Patient will benefit from therapy services to further progress independence with mobility.    Safety Interventions: patient left in bed, bed alarm in place, and nurses and MD present at time of departure    Plan  Frequency: BID tx  Current Treatment Recommendations: strengthening, balance training, functional mobility training, transfer training, gait training, stair training, endurance training, patient/caregiver education, safety education, and equipment evaluation/education    Goals  Patient Goals: to go home   Short Term Goals:  Time Frame: to be met by d/c  Patient will complete bed mobility at Independent Patient will complete stand step transfers at modified independent   Patient will ambulate 150 ft with use of rolling walker at modified independent  Patient will ascend/descend 15 stairs with (R) ascending handrail at modified independent    Goals set at mod I due to patient likely to present with higher functional status with improved vitals.      Therapy Session Time      Individual Group Co-treatment   Time In     2070   Time Out     1244   Minutes     38     Timed Code Treatment Minutes:   23 minutes  Total Treatment Minutes:  38 minutes       Electronically Signed By: Nik John. [facility] 27, DPT [id number]

## 2022-10-13 NOTE — PROGRESS NOTES
Consult noted  See orders    Active Hospital Problems    Diagnosis Date Noted    Arthritis of left knee [M17.12] 10/13/2022     Priority: Medium    Hypothyroid [E03.9] 10/13/2022     Priority: Medium    Morbid obesity ([address] [E66.01] 10/13/2022     Priority: Medium    Hyponatremia [E87.1] 10/13/2022     Priority: Medium    Essential hypertension [I10] 02/10/2010    Asthma [J45.909] 02/10/2010

## 2022-10-13 NOTE — ANESTHESIA POSTPROCEDURE EVALUATION
Department of Anesthesiology  Postprocedure Note    Patient: Joe Stoddard  MRN: [id number]  YOB: 1960  Date of evaluation: 10/13/2022      Procedure Summary     Date: 10/13/22 Room / Location: [address]    Anesthesia Start: 3308 Anesthesia Stop: 0900    Procedure: LEFT TOTAL KNEE ARTHROPLASTY; ADDUCTOR BLOCK - Mayur Beltran (Left: Knee) Diagnosis:       Primary osteoarthritis of one knee, left      (Primary osteoarthritis of one knee, left [M17.12])    Surgeons: Gwendolyn Resendez MD Responsible Provider: Alina Cartagena MD    Anesthesia Type: General ASA Status: 3          Anesthesia Type: General    Orlando Phase I: Orlando Score: 8    Orlando Phase II:        Anesthesia Post Evaluation    Patient location during evaluation: PACU  Patient participation: complete - patient participated  Level of consciousness: awake and alert  Airway patency: patent  Nausea & Vomiting: no nausea and no vomiting  Complications: no  Cardiovascular status: blood pressure returned to baseline  Respiratory status: acceptable  Hydration status: euvolemic  Multimodal analgesia pain management approach

## 2022-10-13 NOTE — CONSULTS
Consult -Internal Medicine  Dr. Pam Juan  10/13/2022      PCP: SOPHIA Simmons MD  Referring Physician: Maykel Brambila, *    Code Status: Full Code  Current Diet: No diet orders on file      Cc: Maksim Wallace is a56 y.o. female who presents with Arthritis of left knee. Problem list of hospitalization thus far: Active Hospital Problems    Diagnosis Date Noted    Arthritis of left knee [M17.12] 10/13/2022     Priority: Medium    Hypothyroid [E03.9] 10/13/2022     Priority: Medium    Morbid obesity ([address] [E66.01] 10/13/2022     Priority: Medium    Hyponatremia [E87.1] 10/13/2022     Priority: Medium    Essential hypertension [I10] 02/10/2010    Asthma [J45.909] 02/10/2010     HPI: Maksim Wallace has been admitted by Maykel Brambila, * with L knee OA. Pt presents with with above complaint. Pt has has severe pain to the L knee  Duration - going on for at least 1 year(s)  It is described as a persistent pain that is aching in quality. Severity rated as 9 out of 10 at its worst  Pain is so severe that it limits usual activities  No improvement with medications, therapy or injections  As it is not improved with conservative measures, surgery has been recommended    Pt has undergone L TKA without any apparent complications. Pt is doing well post operatively. Pain is controlled at this time. I have been asked to see the patient for evaluation of her internal medicine issues:  has a past medical history of Anesthesia, Asthma, At risk for falling, Bipolar Disorder, Cancer ([address] Chronic low back pain, Degenerative joint disease, Fibromyalgia, Hypertension, Kidney stone, Numbness of feet, Obesity, CRIS on CPAP, Osteoarthritis, Reflux, RLS (restless legs syndrome), and Wears glasses. .  Home meds have been reveiwed and restartedas indicated. Pt denies having other complaints at this time.     Pt has been evaluated post operatively  Pain does appear to be controlled - on iv meds  Patient has not worked with therapy at this time      Electronic chart reviewed  Home meds reviewed and restarted as indicated  Op note, anesthesia flowsheet reviewed  Case d/w nursing     Doing ok post op  Pain controlled in recovery  Has not yet worked with therapy at time of my eval      Review of Systems: (1 system for EPF, 2-9 for detailed, 10+ for comprehensive)  Constitutional: Negative for chills and fever. HENT: Negative for ear pain and mouth sores. Eyes: Negative for pain, redness and visual disturbance. Respiratory: Negative for cough, shortness of breath and wheezing. Cardiovascular: Negative for chest pain and leg swelling. Gastrointestinal: Negative for diarrhea, nausea and vomiting. Endocrine: Negative for polydipsia and polyphagia. Genitourinary: Negative for frequency and urgency. Musculoskeletal: Negative for back pain and neck pain.  +knee pain  Skin: Negative for color change. Allergic/Immunologic: Negative for food allergies. Neurological: Negative for seizures and syncope. Hematological: Does not bruise/bleed easily. Psychiatric/Behavioral: Negative for confusion. The patient is not nervous/anxious.              Past Medical History:   Past Medical History:   Diagnosis Date    Anesthesia     slow to wake    Asthma     At risk for falling     Wears brace and takes mood alternating,  and hypertensive meds    Bipolar Disorder     Cancer ([facility] [address]     skin    Chronic low back pain     Degenerative joint disease     Fibromyalgia     Hypertension     Kidney stone 2013    lithotripsy done    Numbness of feet     states it is sciatica    Obesity     CRIS on CPAP     Osteoarthritis     Reflux     RLS (restless legs syndrome)     Wears glasses        Past Surgical History:   Past Surgical History:   Procedure Laterality Date    APPENDECTOMY      BLADDER SUSPENSION      BREAST BIOPSY      Left    BREAST SURGERY      lumpectomy-benign     SECTION 3-live births    [facility] (CERVIX STATUS UNKNOWN)  2003    JOINT REPLACEMENT Right     knee    KNEE ARTHROPLASTY  6/15/2011    right    KNEE ARTHROSCOPY      right    LAP BAND  11/18/08    OTHER SURGICAL HISTORY  12-9-14    LAPAROSCOPIC ADJUSTABLE GASTRIC BAND REMOVAL    SKIN CANCER EXCISION      TOTAL KNEE ARTHROPLASTY Left 10/13/2022    LEFT TOTAL KNEE ARTHROPLASTY; ADDUCTOR BLOCK - Candance Colon performed by Brynn Slade MD at [address] [place] [address] History:   Social History       Tobacco History       Smoking Status  Former Quit Date  11/1/2008 Smoking Frequency  2.00 packs/day for 35.00 years (70.00 pk-yrs) Smoking Tobacco Type  Cigarettes quit in 11/1/2008      Smokeless Tobacco Use  Never              Alcohol History       Alcohol Use Status  Not Currently Comment  occ. Drug Use       Drug Use Status  No              Sexual Activity       Sexually Active  Not Asked                    Fam History:   Family History   Problem Relation Age of Onset    Obesity Mother     Hypertension Mother     Arthritis Mother         hip replacement-bilateral    Hypertension Father     Lupus Sister     Rheum Arthritis Sister     Rashes/Skin Problems Sister        PFSH: The above PMHx, PSHx, SocHx, FamHx has been reviewed by myself. (1 area for detailed, 2-3 for comprehensive)      Code Status: Full Code    Meds - following list ofhome medications from electronic chart has been reviewed by myself  Prior to Admission medications    Medication Sig Start Date End Date Taking? Authorizing Provider   oxyCODONE (ROXICODONE) 5 MG immediate release tablet Take 1 tablet by mouth every 6 hours as needed for Pain for up to 7 days. Intended supply: 7 days.  Take lowest dose possible to manage pain 10/13/22 10/20/22 Yes Faye Melendez   acetaminophen (TYLENOL) 500 MG tablet Take 2 tablets by mouth 3 times daily 10/13/22  Yes Faye Melendez   cefadroxil (DURICEF) 500 MG capsule Take 1 capsule by mouth 2 times daily for 7 days 10/13/22 10/20/22 Yes Ino [place], [place]   docusate sodium (COLACE) 100 MG capsule Take 1 capsule by mouth 2 times daily 10/13/22  Yes Faye Daly   calcium carbonate (OYSTER SHELL CALCIUM 500 MG) 1250 (500 Ca) MG tablet Take 1 tablet by mouth daily   Yes Historical Provider, MD   senna-docusate (PERICOLACE) 8.6-50 MG per tablet Take 1 tablet by mouth daily   Yes Historical Provider, MD   gabapentin (NEURONTIN) 300 MG capsule Take 600 mg by mouth nightly. Yes Historical Provider, MD   ferrous sulfate (IRON 325) 325 (65 Fe) MG tablet Take 325 mg by mouth daily (with breakfast) Daily every other day takes an extra one   Yes Historical Provider, MD   levothyroxine (SYNTHROID) 88 MCG tablet Take 100 mcg by mouth Daily   Yes Historical Provider, MD   rivaroxaban (XARELTO) 20 MG TABS tablet Take 20 mg by mouth   Yes Historical Provider, MD   Liraglutide -Weight Management (SAXENDA SC) Inject into the skin daily   Yes Historical Provider, MD   Multiple Vitamins-Minerals (CENTRUM SILVER ADULT 50+ PO) Take by mouth 2 times daily    Historical Provider, MD   vitamin B-12 (CYANOCOBALAMIN) 1000 MCG tablet Take 1,000 mcg by mouth three times daily    Historical Provider, MD   montelukast (SINGULAIR) 10 MG tablet Take 10 mg by mouth 8/28/14   Historical Provider, MD   ADVAIR DISKUS 500-50 MCG/DOSE diskus inhaler  11/12/15   Historical Provider, MD   topiramate (TOPAMAX) 25 MG tablet 50 mg daily  11/13/15   Historical Provider, MD   fexofenadine (ALLEGRA) 180 MG tablet Take 180 mg by mouth daily. Historical Provider, MD   estradiol (ESTRACE) 1 MG tablet Take 1 mg by mouth daily. Historical Provider, MD   triamterene-hydrochlorothiazide (MAXZIDE-25) 37.5-25 MG per tablet Take 1 tablet by mouth daily Takes half a tablet    Historical Provider, MD   buPROPion (WELLBUTRIN XL) 150 MG XL tablet Take 150 mg by mouth every morning.     Historical Provider, MD   topiramate (TOPAMAX) 100 MG tablet Take 100 mg by mouth daily  6/10/10   Historical Provider, MD   ALBUTEROL IN Inhale  into the lungs as needed. Historical Provider, MD   oxcarbazepine (TRILEPTAL) 300 MG tablet Take 900 mg by mouth nightly. 6/10/10   Historical Provider, MD   Cholecalciferol (VITAMIN D) 1000 UNIT TABS Take 2,000 Int'l Units by mouth daily. 4/12/10   Onesimo Lucas MD         Allergies   Allergen Reactions    Codeine Nausea Only     Can take if gets phenergan 1st    Lisinopril Other (See Comments)     cough             EXAM: (2-7 system forEPF/Detailed, ?8 for Comprehensive)  /66   Pulse 68   Temp 97.7 °F (36.5 °C) (Oral)   Resp 12   Ht 5' 3\" (1.6 m)   Wt 224 lb (101.6 kg)   SpO2 93%   BMI 39.68 kg/m²   Constitutional: vitals as above: alert, appears stated age and cooperative    Psychiatric: normal insight and judgment, oriented to person, place, time, and general circumstances    Head: Normocephalic, without obvious abnormality, atraumatic    Eyes:lids and lashes normal, conjunctivae and sclerae normal and pupils equal, round, reactive to light and accomodation    EMNT: external ears normal, nares midline    Neck: no carotid bruit, supple, symmetrical, trachea midline and thyroid not enlarged, symmetric, no tenderness/mass/nodules     Respiratory: clear to auscultation and percussion bilaterally with normal respiratory effort    Cardiovascular: normal rate, regular rhythm, normal S1 and S2 and no murmurs    Gastrointestinal: soft, non-tender, non-distended, normal bowel sounds, no masses or organomegaly    Extremities: no clubbing, no edema - dressing L knee CDI   Skin:No rashes or nodules noted.     Neurologic:negative           LABS:  Labs Reviewed   RENAL FUNCTION PANEL - Abnormal; Notable for the following components:       Result Value    Sodium 127 (*)     Chloride 95 (*)     Glucose 148 (*)     Creatinine 0.5 (*)     All other components within normal limits   CBC WITH AUTO DIFFERENTIAL - Abnormal; Notable for the following components:    RBC 3.82 (*)     Hematocrit 35.9 (*)     Lymphocytes Absolute 0.3 (*)     All other components within normal limits   POCT GLUCOSE - Abnormal; Notable for the following components:    POC Glucose 128 (*)     All other components within normal limits   SPECIMEN REJECTION    Narrative:     CALL  [facility] tel. [phone],  Rejected Test TS3C/Called to:RN [facility], 10/13/2022 07:06, by Renee Gudino   POCT GLUCOSE   POCT GLUCOSE   TYPE AND SCREEN         IMAGING:  Imaging has been reviewed in the computerized chart  XR KNEE LEFT (1-2 VIEWS)    Result Date: 10/13/2022  EXAMINATION: 2 XRAY VIEWS OF THE LEFT KNEE 10/13/2022 9:13 am COMPARISON: None. HISTORY: ORDERING SYSTEM PROVIDED HISTORY: post op film TECHNOLOGIST PROVIDED HISTORY: Of operative side while in recovery room. Reason for exam:->post op film Reason for Exam: post op film FINDINGS: There is a total knee arthroplasty with cemented components. No acute fracture or dislocation. No acute hardware failure or loosening. Grossly normal alignment. There is a joint effusion. Total knee arthropasty without acute hardware complication. EKG: reviewed if available      Lab Results   Component Value Date/Time    GLUCOSE 148 10/13/2022 12:57 PM     Lab Results   Component Value Date/Time    POCGLU 128 10/13/2022 12:29 PM     /66   Pulse 68   Temp 97.7 °F (36.5 °C) (Oral)   Resp 12   Ht 5' 3\" (1.6 m)   Wt 224 lb (101.6 kg)   SpO2 93%   BMI 39.68 kg/m²     MEDICAL DECISION MAKING:    Principal Problem:    Arthritis of left knee -New Problem to me. Doing ok post op. Films reviewed; TKA in good alignment  Plan: Continue on post-operative pathway. PT/OT to see patient. Continue pain control - on IV pain meds acutely post op. Work on transitioning to oral pain meds when possible. Will follow serial h/h to monitor for acute blood loss anemia - labs ordered for tomorrow.    Active Problems:    Hypothyroid -Established problem. Stable. Plan: stay on thyroid replacement    Morbid obesity ([address]  Plan: Continue present orders/plan. Hyponatremia -Established problem. Stable. 127  Plan: ivf for now, repeat labs. Hold hctz    Asthma  Plan: Continue present orders/plan. Essential hypertension -Established problem. Stable. 105/66  Plan: hold hctz        Diagnoses as listed above, designated as new or established and plan outlined for each. Data Reviewed:   (1) Lab tests were reviewed or ordered. (1) Radiology tests were reviewed or ordered. (1) Medical test (Echo, EKG, PFT/virgilio) were not ordered. (1) History was not obtained from someone other than patient  (1) Old records  were reviewed - see HPI/MDM for pertinent details if review done. (2) Case was discussed with another health care provider: Kajal Tay  (2) Imaging was viewed by myself. (2) EKG  was not viewed by myself. Thanks for the consult! Will follow along daily while patient is in house.       (Please note that portions of this note were completed with a voice recognition program.  Efforts were made to edit the dictations but occasionally words are mis-transcribed.)      Pamela Grey MD  10/13/2022

## 2022-10-14 VITALS
HEART RATE: 67 BPM | RESPIRATION RATE: 16 BRPM | TEMPERATURE: 97.4 F | DIASTOLIC BLOOD PRESSURE: 66 MMHG | BODY MASS INDEX: 39.69 KG/M2 | OXYGEN SATURATION: 97 % | SYSTOLIC BLOOD PRESSURE: 100 MMHG | HEIGHT: 63 IN | WEIGHT: 224 LBS

## 2022-10-14 PROBLEM — D62 ACUTE BLOOD LOSS AS CAUSE OF POSTOPERATIVE ANEMIA: Status: ACTIVE | Noted: 2022-10-14

## 2022-10-14 LAB
ANION GAP SERPL CALCULATED.3IONS-SCNC: 8 MMOL/L (ref 3–16)
BASOPHILS ABSOLUTE: 0 K/UL (ref 0–0.2)
BASOPHILS RELATIVE PERCENT: 0.4 %
BUN BLDV-MCNC: 9 MG/DL (ref 7–20)
CALCIUM SERPL-MCNC: 8.1 MG/DL (ref 8.3–10.6)
CHLORIDE BLD-SCNC: 98 MMOL/L (ref 99–110)
CO2: 24 MMOL/L (ref 21–32)
CREAT SERPL-MCNC: <0.5 MG/DL (ref 0.6–1.2)
EKG ATRIAL RATE: 73 BPM
EKG DIAGNOSIS: NORMAL
EKG P AXIS: 44 DEGREES
EKG P-R INTERVAL: 208 MS
EKG Q-T INTERVAL: 432 MS
EKG QRS DURATION: 100 MS
EKG QTC CALCULATION (BAZETT): 475 MS
EKG R AXIS: 46 DEGREES
EKG T AXIS: 65 DEGREES
EKG VENTRICULAR RATE: 73 BPM
EOSINOPHILS ABSOLUTE: 0.1 K/UL (ref 0–0.6)
EOSINOPHILS RELATIVE PERCENT: 0.9 %
GFR AFRICAN AMERICAN: >60
GFR NON-AFRICAN AMERICAN: >60
GLUCOSE BLD-MCNC: 91 MG/DL (ref 70–99)
HCT VFR BLD CALC: 27.7 % (ref 36–48)
HEMOGLOBIN: 9.4 G/DL (ref 12–16)
LYMPHOCYTES ABSOLUTE: 1.2 K/UL (ref 1–5.1)
LYMPHOCYTES RELATIVE PERCENT: 18.4 %
MCH RBC QN AUTO: 31.8 PG (ref 26–34)
MCHC RBC AUTO-ENTMCNC: 34 G/DL (ref 31–36)
MCV RBC AUTO: 93.6 FL (ref 80–100)
MONOCYTES ABSOLUTE: 0.7 K/UL (ref 0–1.3)
MONOCYTES RELATIVE PERCENT: 11.3 %
NEUTROPHILS ABSOLUTE: 4.5 K/UL (ref 1.7–7.7)
NEUTROPHILS RELATIVE PERCENT: 69 %
PDW BLD-RTO: 13.2 % (ref 12.4–15.4)
PLATELET # BLD: 140 K/UL (ref 135–450)
PMV BLD AUTO: 7.8 FL (ref 5–10.5)
POTASSIUM SERPL-SCNC: 3.4 MMOL/L (ref 3.5–5.1)
RBC # BLD: 2.96 M/UL (ref 4–5.2)
SODIUM BLD-SCNC: 130 MMOL/L (ref 136–145)
WBC # BLD: 6.6 K/UL (ref 4–11)

## 2022-10-14 PROCEDURE — 94761 N-INVAS EAR/PLS OXIMETRY MLT: CPT

## 2022-10-14 PROCEDURE — 2580000003 HC RX 258: Performed by: ORTHOPAEDIC SURGERY

## 2022-10-14 PROCEDURE — 6370000000 HC RX 637 (ALT 250 FOR IP): Performed by: ORTHOPAEDIC SURGERY

## 2022-10-14 PROCEDURE — 97116 GAIT TRAINING THERAPY: CPT

## 2022-10-14 PROCEDURE — 85025 COMPLETE CBC W/AUTO DIFF WBC: CPT

## 2022-10-14 PROCEDURE — 94640 AIRWAY INHALATION TREATMENT: CPT

## 2022-10-14 PROCEDURE — 80048 BASIC METABOLIC PNL TOTAL CA: CPT

## 2022-10-14 PROCEDURE — 97535 SELF CARE MNGMENT TRAINING: CPT

## 2022-10-14 PROCEDURE — 97530 THERAPEUTIC ACTIVITIES: CPT

## 2022-10-14 PROCEDURE — 97110 THERAPEUTIC EXERCISES: CPT

## 2022-10-14 PROCEDURE — 93010 ELECTROCARDIOGRAM REPORT: CPT | Performed by: INTERNAL MEDICINE

## 2022-10-14 RX ADMIN — OXYCODONE 5 MG: 5 TABLET ORAL at 03:42

## 2022-10-14 RX ADMIN — OXYCODONE 5 MG: 5 TABLET ORAL at 12:37

## 2022-10-14 RX ADMIN — OXYCODONE 5 MG: 5 TABLET ORAL at 08:38

## 2022-10-14 RX ADMIN — Medication 2 PUFF: at 09:07

## 2022-10-14 RX ADMIN — LEVOTHYROXINE SODIUM 100 MCG: 0.1 TABLET ORAL at 06:23

## 2022-10-14 RX ADMIN — BUPROPION HYDROCHLORIDE 150 MG: 150 TABLET, EXTENDED RELEASE ORAL at 08:47

## 2022-10-14 RX ADMIN — SODIUM CHLORIDE: 9 INJECTION, SOLUTION INTRAVENOUS at 00:01

## 2022-10-14 RX ADMIN — ACETAMINOPHEN 1000 MG: 500 TABLET ORAL at 06:23

## 2022-10-14 RX ADMIN — CYANOCOBALAMIN TAB 1000 MCG 1000 MCG: 1000 TAB at 08:47

## 2022-10-14 RX ADMIN — Medication 10 ML: at 08:47

## 2022-10-14 ASSESSMENT — PAIN DESCRIPTION - LOCATION
LOCATION: KNEE
LOCATION: KNEE
LOCATION: LEG

## 2022-10-14 ASSESSMENT — PAIN SCALES - GENERAL
PAINLEVEL_OUTOF10: 10
PAINLEVEL_OUTOF10: 7
PAINLEVEL_OUTOF10: 8
PAINLEVEL_OUTOF10: 9

## 2022-10-14 ASSESSMENT — PAIN DESCRIPTION - DESCRIPTORS
DESCRIPTORS: ACHING;DISCOMFORT;HEAVINESS
DESCRIPTORS: ACHING
DESCRIPTORS: ACHING

## 2022-10-14 ASSESSMENT — PAIN DESCRIPTION - ORIENTATION
ORIENTATION: LEFT

## 2022-10-14 NOTE — PROGRESS NOTES
[facility] [address]   Phone: [phone]    Occupational Therapy    [] Initial Evaluation            [x] Daily Treatment Note         [] Discharge Summary      Patient: Ellen Barrera   : [id number]   MRN: [id number]   Date of Service:  10/14/2022    Admitting Diagnosis:  Arthritis of left knee  Current Admission Summary: Pt is s/p L TKA on 10/13/22 by Dr. Germain Stafford. Past Medical History:  has a past medical history of Anesthesia, Asthma, At risk for falling, Bipolar Disorder, Cancer ([facility] [address] Chronic low back pain, Degenerative joint disease, Fibromyalgia, Hypertension, Kidney stone, Numbness of feet, Obesity, CRIS on CPAP, Osteoarthritis, Reflux, RLS (restless legs syndrome), and Wears glasses. Past Surgical History:  has a past surgical history that includes Hysterectomy (); Cholecystectomy ();  section; Lap Band (08); Appendectomy (); bladder suspension; Knee arthroscopy; Skin cancer excision; Knee Arthroplasty (6/15/2011); other surgical history (14); Breast surgery (); Breast biopsy; joint replacement (Right); and Total knee arthroplasty (Left, 10/13/2022). Discharge Recommendations: Ellen Barrera scored a 19/24 on the AM-PAC ADL Inpatient form. Current research shows that an AM-PAC score of 18 or greater is typically associated with a discharge to the patient's home setting. Based on the patient's AM-PAC score, and their current ADL deficits, it is recommended that the patient have 2-3 sessions per week of Occupational Therapy at d/c to increase the patient's independence. At this time, this patient demonstrates the endurance and safety to discharge home with home services and a follow up treatment frequency of 2-3x/wk. Please see assessment section for further patient specific details.     HOME HEALTH CARE: LEVEL 3 SAFETY     - Initial home health evaluation to occur within 24-48 hours, in patient home   - Therapy evaluations in home within 24-48 hours of discharge; including DME and home safety   - Frontload therapy 5 days, then 3x a week   - Therapy to evaluate if patient has [id number] needs for personal care   -  evaluation within 24-48 hours, includes evaluation of resources and insurance to determine [place], [place], LTC, and Medicaid options     If patient discharges prior to next session this note will serve as a discharge summary. Please see below for the latest assessment towards goals. DME Required For Discharge: rolling walker, tub transfer bench, hand-held shower head, grab bars - shower, reacher    Precautions/Restrictions: high fall risk  Weight Bearing Restrictions: weight bearing as tolerated  [] Right Upper Extremity  [] Left Upper Extremity [] Right Lower Extremity  [x] Left Lower Extremity     Required Braces/Orthotics: no braces required   [] Right  [] Left  Positional Restrictions:no positional restrictions    Pre-Admission Information   Lives With: spouse    Type of Home: house  Home Layout: two level, 5 steps + 10 to 2nd floor  Home Access:  1 step to enter without rails   Bathroom Layout:  Remodeling downstairs to walk-in shower; tub shower upstairs  Bathroom Equipment:  None  Toilet Height: standard height  Home Equipment: no prior equipment  Transfer Assistance: Independent without use of device  Ambulation Assistance:Independent without use of device  ADL Assistance: independent with all ADL's  IADL Assistance: independent with homemaking tasks  Active :        [x] Yes  [] No  Hand Dominance: [] Left  [] Right  Current Employment: retired. Occupation: RN  Hobbies:   Recent Falls: 1 recent fall; tripped 4 mos ago. Comment: Pt and spouse having entire basement and 1st floor remodeled. No furniture currently in main level of home. Subjective  General: Pt supine in bed upon arrival, agreeable to OT/PT cotx session.  Pt having just received meds but upset that she was \"behind by an hr\" and reporting increased pn but would not rate upon questioning. Pt pleasant and motivated, participating in all activities asked of her. Pt politely declining offered ADLs at this time, stating she prefer to wait to dress with her  before d/c.   Pain: Patient does not rate upon questioning  Pain Interventions: pain medication in place prior to arrival and ice applied Ice machine on L LE knee at [facility]        Activities of Daily Living  Basic Activities of Daily Living  Upper Extremity Dressing: setup assistance  Dressing Comments: Maxx Gamma add'l gown to posterior at Setup  General Comments: Pt declined all other offered ADLs this date    Instrumental Activities of Daily Living  No IADL completed on this date. Functional Mobility  Bed Mobility  Supine to Sit: modified independent, HOB elevated, use of leg  for L LE  Sit to Supine: modified independent, flat bed, use of leg  for L LE  Rolling Right: modified independent  Scooting: modified independent  Comments: Pt wanted to practice rolling to R side at EOS, demo'd ability to complete w/use of leg  at [place]  Transfers  Sit to stand transfer:stand by assistance  Stand to sit transfer: stand by assistance  Stand step transfer: stand by assistance  Car transfer: minimal assistance  Comments: Tyson for L LE in/out of car    Functional Mobility:  Sitting Balance: supervision. Standing Balance: stand by assistance. Standing Balance Comment: CGA w/RW  Functional Mobility: .  contact guard assistance  Functional Mobility Activity: ~60 feet total  Functional Mobility Device Use: rolling walker  Functional Mobility Comment: no LOB, slow pacing d/t increased pn level.  Please defer to PT note for further gait quality    Functional Outcomes  AM-PAC Inpatient Daily Activity Raw Score: 19    Cognition  WFL  Orientation:    alert and oriented x 4  Command Following:   WFL (until pt experienced LOC)     Education  Barriers To Learning: with the above treatment note.  Heide Martinez OTR/L [id number]

## 2022-10-14 NOTE — PROGRESS NOTES
Department of Orthopedic Surgery     Progress Note    Subjective:   Admit Day:10/13/2022    Patient is comfortable appearing. Denies chest pain, shortness of air or calf pain. Tolerating PO. Objective[de-identified]    height is 5' 3\" (1.6 m) and weight is 224 lb (101.6 kg). Her oral temperature is 97.4 °F (36.3 °C). Her blood pressure is 101/69 and her pulse is 62. Her respiration is 16 and oxygen saturation is 97%. Dressing cdi, calf soft, neg dawood, nvi      Labs:  Lab Results   Component Value Date/Time    WBC 7.9 10/13/2022 12:57 PM    HGB 12.0 10/13/2022 12:57 PM    HCT 35.9 10/13/2022 12:57 PM     10/13/2022 12:57 PM       Lab Results   Component Value Date/Time    INR 1.47 09/19/2022 01:00 PM       Lab Results   Component Value Date/Time     10/13/2022 12:57 PM    K 4.1 10/13/2022 12:57 PM    CO2 23 10/13/2022 12:57 PM    BUN 9 10/13/2022 12:57 PM    CREATININE 0.5 10/13/2022 12:57 PM    CALCIUM 8.4 10/13/2022 12:57 PM       Assessment:   Principal Problem:    Arthritis of left knee  Active Problems:    Hypothyroid    Morbid obesity (HCC)    Hyponatremia    Asthma    Essential hypertension  Resolved Problems:    * No resolved hospital problems.  *      s/p TKA      Plan:   Cont w pathway  Dc planning home today after PT  Outpatient PT Monday  Fu w ortho 2 weeks

## 2022-10-14 NOTE — PROGRESS NOTES
Patient admitted for total knee had a Rapid response earlier today. So she is staying over night. Patient takes Colace every night and is not currently on her MAR. Also, She takes Trileptal 900 mg it is no the MAR but someone put it in wrong and it is not starting until tomorrow night. Can we please  change that as well.  Thank you,    New orders verified with Dr. Serafin Sagastume

## 2022-10-14 NOTE — FLOWSHEET NOTE
10/13/22 2047   Assessment   Charting Type Shift assessment   Psychosocial   Psychosocial (WDL) WDL   Neurological   Neuro (WDL) WDL   Level of Consciousness 0   Orientation Level Oriented X4   [place] Coma Scale   Eye Opening 4   Best Verbal Response 5   Best Motor Response 6   [place] Coma Scale Score 15   HEENT (Head, Ears, Eyes, Nose, & Throat)   HEENT (WDL) X   Right Eye Glasses   Left Eye Glasses   Respiratory   Respiratory (WDL) WDL   Respiratory Interventions Cough & deep breathe   Respiratory Pattern Regular   Respiratory Depth Normal   Respiratory Quality/Effort Unlabored   Chest Assessment Chest expansion symmetrical   Cardiac   Cardiac (WDL) X   Pacemaker   Pacemaker Yes   Gastrointestinal   Abdominal (WDL) WDL   Genitourinary   Genitourinary (WDL) WDL   Peripheral Vascular   Peripheral Vascular (WDL) WDL   RLE Neurovascular Assessment   Capillary Refill Less than/Equal to 3 seconds   Color Appropriate for Ethnicity   Temperature Warm   R Pedal Pulse +2   LLE Neurovascular Assessment   Capillary Refill Less than/Equal to 3 seconds   Color Appropriate for Ethnicity   Temperature Warm   L Pedal Pulse +2   Skin Integumentary    Skin Integumentary (WDL) X   Musculoskeletal   Musculoskeletal (WDL) X   LL Extremity Ace wrap;Surgery   Musculoskeletal Details   L Knee Surgery   Incision 10/13/22 Knee Anterior; Left   Date First Assessed/Time First Assessed: 10/13/22 0747   Present on Hospital Admission: No  Location: Knee  Incision Location Orientation: Anterior; Left   Dressing Status Clean;Dry; Intact   Dressing/Treatment Ace wrap; Other (comment)   Drainage Amount None

## 2022-10-14 NOTE — PROGRESS NOTES
Problem: Psychotic Symptoms  Goal: Psychotic Symptoms  Signs and symptoms of listed problems will be absent or manageable.   Outcome: Improving  Pt presenting as calmer and more organized this evening.  She requested her HS medications early, and when told she needed to wait she was frustrated, but handled the interaction calmly.  She denies any SI/HIB/HI or any psychotic symptoms.  She continues to present as somewhat paranoid and guarded and remains mostly isolated and withdrawn to her room.  She was medication compliant and denies any discomfort.  She is anxious to leave the hospital and doesn't believe that she needs to be here.        [facility] Department   Phone: [phone]    Physical Therapy    [] Initial Evaluation            [x] Daily Treatment Note         [] Discharge Summary      Patient: Sharon Maradiaga   : [age]   MRN: [id number]   Date of Service:  10/14/2022  Admitting Diagnosis: Arthritis of left knee  Current Admission Summary:  Pt is s/p L TKA on 10/13/22 by Dr. Betty Kenny. Past Medical History:  has a past medical history of Anesthesia, Asthma, At risk for falling, Bipolar Disorder, Cancer ([facility] [address] Chronic low back pain, Degenerative joint disease, Fibromyalgia, Hypertension, Kidney stone, Numbness of feet, Obesity, CRIS on CPAP, Osteoarthritis, Reflux, RLS (restless legs syndrome), and Wears glasses. Past Surgical History:  has a past surgical history that includes Hysterectomy (); Cholecystectomy ();  section; Lap Band (08); Appendectomy (); bladder suspension; Knee arthroscopy; Skin cancer excision; Knee Arthroplasty (6/15/2011); other surgical history (14); Breast surgery (); Breast biopsy; joint replacement (Right); and Total knee arthroplasty (Left, 10/13/2022). Discharge Recommendations: Sharon Maradiaga scored a 18/24 on the AM-PAC short mobility form. Current research shows that an AM-PAC score of 18 or greater is typically associated with a discharge to the patient's home setting. Based on the patient's AM-PAC score and their current functional mobility deficits, it is recommended that the patient have 2-3 sessions per week of Physical Therapy at d/c to increase the patient's independence. At this time, this patient demonstrates the endurance and safety to discharge home with home services and a follow up treatment frequency of 2-3x/wk. However pt is scheduled to start outpatient PT on 10/17 so will likely refuse HHPT. Please see assessment section for further patient specific details.     If patient discharges prior to next session this note will serve as a discharge summary. Please see below for the latest assessment towards goals. HOME HEALTH CARE: LEVEL 3 SAFETY     - Initial home health evaluation to occur within 24-48 hours, in patient home   - Therapy evaluations in home within 24-48 hours of discharge; including DME and home safety   - Frontload therapy 5 days, then 3x a week   - Therapy to evaluate if patient has [address] needs for personal care   -  evaluation within 24-48 hours, includes evaluation of resources and insurance to determine AL, [place], LTC, and Medicaid options     DME Required For Discharge: rolling walker, tub transfer bench, hand-held shower head, grab bars - shower, reacher, leg   Precautions/Restrictions: high fall risk   Weight Bearing Restrictions: weight bearing as tolerated  [] Right Upper Extremity  [] Left Upper Extremity [] Right Lower Extremity  [x] Left Lower Extremity     Required Braces/Orthotics: no braces required   [] Right  [] Left  Positional Restrictions:no positional restrictions    Pre-Admission Information   Lives With: spouse                  Type of Home: house  Home Layout: two level, 5 steps + 10 to 2nd floor  Home Access:  1 step to enter without rails   Bathroom Layout:  Remodeling downstairs to walk-in shower; tub shower upstairs  Bathroom Equipment:  None  Toilet Height: standard height  Home Equipment: no prior equipment  Transfer Assistance: Independent without use of device  Ambulation Assistance:Independent without use of device  ADL Assistance: independent with all ADL's  IADL Assistance: independent with homemaking tasks  Active :        [x] Yes                 [] No  Hand Dominance: [] Left                 [] Right  Current Employment: retired. Occupation: RN  Hobbies:   Recent Falls: 1 recent fall; tripped 4 mos ago. Comment: Pt and spouse having entire basement and 1st floor remodeled. No furniture currently in main level of home. Examination   Vision:   Vision Gross Assessment: WFL  Hearing:   WFL  Sensation:   WFL  Proprioception:    WFL  Tone:   Normotonic  Coordination Testing:   WFL    ROM:   (L) Knee: lacking ~10 degrees extension, 85 degrees flexion     (R) Knee: WFL  Strength:   (B) LE strength grossly WFL  Decision Making: medium complexity  Clinical Presentation: unstable--LOC on toilet      Subjective  General: Pt supine in bed upon arrival, not reporting pain level upon questioning but visibly in pain. Pain: 0/10  Pain Interventions: not applicable       Functional Mobility  Bed Mobility  Supine to Sit: modified independent  Sit to Supine: modified independent  Rolling Left: modified independent due to LOC patient returned to bed with maxi-move  Comments:use of leg  for getting in and out of bed and rolling   Transfers  Sit to stand transfer: stand by assistance  Stand to sit transfer: stand by assistance  Stand step transfer: stand by assistance  Comments: transfers completed up to RW  Ambulation  Surface:level surface  Assistive Device: rolling walker  Assistance: contact guard assistance; progressing to SBA   Distance: 61' with intermittent short distances during session. Gait Mechanics: reciprocal gait pattern, decreased millie, heavy WB through UEs  Comments:    Stair Mobility  Number of Steps: 8  Step Height: 6 inch  Hand Rails: pt has 2 sets of stairs with landing in between to get to 2nd level, handrails are opposite on each set so both sides were attempted  Assistance: contact guard assistance  Comments:  Wheelchair Mobility:  No w/c mobility completed on this date.   Comments:  Balance  Static Sitting Balance: good: independent with functional balance in unsupported position  Dynamic Sitting Balance: good: independent with functional balance in unsupported position  Static Standing Balance: fair (-): maintains balance at SBA with use of UE support  Dynamic Standing Balance: fair (-): maintains balance at CGA with use of UE support  Comments:    Other Therapeutic Interventions  Pt educated on AAROM to improve knee ROM, quad sets, SLR with use of leg  for assist, heel slides on towel in seated. Functional Outcomes  AM-PAC Inpatient Mobility Raw Score : 18              Cognition  WFL  Orientation:    alert and oriented x 4  Command Following:   [facility]    Education  Barriers To Learning: none  Patient Education: patient educated on goals, PT role and benefits, plan of care, general safety, functional mobility training, proper use of assistive device/equipment, transfer training, discharge recommendations  Learning Assessment:  patient verbalizes and demonstrates understanding    Assessment  Activity Tolerance: Improving on this date, increased time for all mobility due to pain    Impairments Requiring Therapeutic Intervention: decreased functional mobility, decreased strength, decreased endurance, decreased balance  Prognosis: good  Clinical Assessment: Pt presenting below baseline function secondary to L TKA. Pt with increased pain, decreased, ROM, and decreased strength limiting functional mobility. Pt showing improved mobility on this date requiring Danika for bed mobility, SBA for transfers, and CGA progressing to SBA for ambulation. Pt has a significant amount of stairs in the home setting, pt able to demonstrate good mechanics at [facility] for stair mobility on this date. HHPT or OP PT is recommended with 24 hour supervision initially at home. Pt would continue to benefit from skilled PT services to improve safe functional mobility and independence.    Safety Interventions: patient left in bed, bed alarm in place, and nurses and MD present at time of departure    Plan  Frequency: BID tx  Current Treatment Recommendations: strengthening, balance training, functional mobility training, transfer training, gait training, stair training, endurance training, patient/caregiver education, safety education, and equipment evaluation/education    Goals  Patient Goals: to go home   Short Term Goals:  Time Frame: to be met by d/c  Patient will complete bed mobility at [facility]   Patient will complete stand step transfers at [facility]   Patient will ambulate 150 ft with use of rolling walker at modified independent  Patient will ascend/descend 15 stairs with (R) ascending handrail at modified independent    No goals met on this date 10/14    Therapy Session Time      Individual Group Co-treatment   Time In     0920   Time Out     1028   Minutes     68     Timed Code Treatment Minutes:   68 minutes  Total Treatment Minutes:  68 minutes       Electronically Signed By: Benson Gray, SUSANA Gray [place], DPT [id number]

## 2022-10-14 NOTE — CARE COORDINATION
Petr received a referral to this patient for a rolling walker. Rolling walker has been delivered to the patients room. Thank you for the referral.  Electronically signed by Yousuf Sweeney on 10/14/2022 at 10:04 AM  Cell ph# [phone]    NOTE: After 5:00 pm, Weekends, Holidays: Call [facility] at [phone] to coordinate delivery of home medical equipment.

## 2022-10-14 NOTE — PROGRESS NOTES
PM assessment completed and medications given. Patient is A&O and denies any needs at this time. Standard precautions are in place.

## 2022-10-14 NOTE — PROGRESS NOTES
Progress Note - Dr. Caitie Dong - Internal Medicine  PCP: SOPHIA Heath MD [address] / [address] [facility] Day: 1  Code Status: Full Code  Current Diet: ADULT DIET; Regular        CC: follow up on medical issues    Subjective:   Mounika Mtz is a 60 y.o. female. Pt seen and examined  Chart reviewed since last visit, labs and imaging below      Doing ok  No complaints  Slept OK      Review of Systems: (1 system for EPF, 2-9 for detailed, 10+ for comprehensive)  Constitutional: Negative for chills and fever. HENT: Negative for dental problem, nosebleeds and rhinorrhea. Eyes: Negative for photophobia and visual disturbance. Respiratory: Negative for cough, chest tightness and shortness of breath. Cardiovascular: Negative for chest pain and leg swelling. Gastrointestinal: Negative for diarrhea, nausea and vomiting. Endocrine: Negative for polydipsia and polyphagia. Genitourinary: Negative for frequency, hematuria and urgency. Musculoskeletal: Negative for back pain and myalgias. +knee pain  Skin: Negative for rash. Allergic/Immunologic: Negative for food allergies. Neurological: Negative for dizziness, seizures, syncope and facial asymmetry. Hematological: Negative for adenopathy. Psychiatric/Behavioral: Negative for dysphoric mood. The patient is not nervous/anxious. I have reviewed the patient's medical and social history in detail and updated the computerized patient record. To recap: She  has a past medical history of Anesthesia, Asthma, At risk for falling, Bipolar Disorder, Cancer ([address] Chronic low back pain, Degenerative joint disease, Fibromyalgia, Hypertension, Kidney stone, Numbness of feet, Obesity, CRIS on CPAP, Osteoarthritis, Reflux, RLS (restless legs syndrome), and Wears glasses. . She  has a past surgical history that includes Hysterectomy (); Cholecystectomy ();   section; Lap Band (11/18/08); Appendectomy (1994); bladder suspension; Knee arthroscopy; Skin cancer excision; Knee Arthroplasty (6/15/2011); other surgical history (12-9-14); Breast surgery (1989); Breast biopsy; joint replacement (Right); and Total knee arthroplasty (Left, 10/13/2022). . She  reports that she quit smoking about 13 years ago. Her smoking use included cigarettes. She has a 70.00 pack-year smoking history. She has never used smokeless tobacco. She reports that she does not currently use alcohol. She reports that she does not use drugs. .        Active Hospital Problems    Diagnosis Date Noted    Acute blood loss as cause of postoperative anemia [D62] 10/14/2022     Priority: Medium    Arthritis of left knee [M17.12] 10/13/2022     Priority: Medium    Hypothyroid [E03.9] 10/13/2022     Priority: Medium    Morbid obesity ([address] [E66.01] 10/13/2022     Priority: Medium    Hyponatremia [E87.1] 10/13/2022     Priority: Medium    Essential hypertension [I10] 02/10/2010    Asthma [J45.909] 02/10/2010       Current Facility-Administered Medications: mometasone-formoterol (DULERA) 200-5 MCG/ACT inhaler 2 puff, 2 puff, Inhalation, BID  albuterol sulfate HFA (PROVENTIL;VENTOLIN;PROAIR) 108 (90 Base) MCG/ACT inhaler 2 puff, 2 puff, Inhalation, Q4H PRN  buPROPion (WELLBUTRIN XL) extended release tablet 150 mg, 150 mg, Oral, QAM  estradiol (ESTRACE) tablet 1 mg, 1 mg, Oral, Daily  gabapentin (NEURONTIN) capsule 600 mg, 600 mg, Oral, Nightly  levothyroxine (SYNTHROID) tablet 100 mcg, 100 mcg, Oral, Daily  montelukast (SINGULAIR) tablet 10 mg, 10 mg, Oral, Nightly  rivaroxaban (XARELTO) tablet 20 mg, 20 mg, Oral, Daily  vitamin B-12 (CYANOCOBALAMIN) tablet 1,000 mcg, 1,000 mcg, Oral, TID  0.9 % sodium chloride infusion, , IntraVENous, Continuous  sodium chloride flush 0.9 % injection 5-40 mL, 5-40 mL, IntraVENous, 2 times per day  sodium chloride flush 0.9 % injection 5-40 mL, 5-40 mL, IntraVENous, PRN  0.9 % sodium chloride infusion, , IntraVENous, PRN  acetaminophen (TYLENOL) tablet 1,000 mg, 1,000 mg, Oral, 3 times per day  oxyCODONE (ROXICODONE) immediate release tablet 5 mg, 5 mg, Oral, Q4H PRN **OR** oxyCODONE (ROXICODONE) immediate release tablet 10 mg, 10 mg, Oral, Q4H PRN  HYDROmorphone HCl PF (DILAUDID) injection 0.25 mg, 0.25 mg, IntraVENous, Q3H PRN **OR** HYDROmorphone HCl PF (DILAUDID) injection 0.5 mg, 0.5 mg, IntraVENous, Q3H PRN  bisacodyl (DULCOLAX) suppository 10 mg, 10 mg, Rectal, Daily PRN  naloxone (NARCAN) injection 0.4 mg, 0.4 mg, IntraVENous, PRN  hydrALAZINE (APRESOLINE) injection 10 mg, 10 mg, IntraVENous, Q6H PRN  potassium chloride (KLOR-CON M) extended release tablet 40 mEq, 40 mEq, Oral, PRN **OR** potassium bicarb-citric acid (EFFER-K) effervescent tablet 40 mEq, 40 mEq, Oral, PRN **OR** potassium chloride 10 mEq/100 mL IVPB (Peripheral Line), 10 mEq, IntraVENous, PRN  0.9 % sodium chloride bolus, 500 mL, IntraVENous, PRN  docusate sodium (COLACE) capsule 100 mg, 100 mg, Oral, Daily  OXcarbazepine (TRILEPTAL) tablet 900 mg, 900 mg, Oral, Nightly  topiramate (TOPAMAX) tablet 150 mg, 150 mg, Oral, Nightly         Objective:  /66   Pulse 67   Temp 97.4 °F (36.3 °C) (Oral)   Resp 16   Ht 5' 3\" (1.6 m)   Wt 224 lb (101.6 kg)   SpO2 98%   BMI 39.68 kg/m²      Patient Vitals for the past 24 hrs:   BP Temp Temp src Pulse Resp SpO2   10/14/22 0800 100/66 97.4 °F (36.3 °C) Oral 67 16 98 %   10/14/22 0516 101/69 97.4 °F (36.3 °C) Oral 62 16 97 %   10/14/22 0412 -- -- -- -- 16 --   10/14/22 0342 -- -- -- -- 16 --   10/14/22 0002 (!) 87/55 97.5 °F (36.4 °C) Oral 64 16 96 %   10/13/22 2343 -- -- -- -- 16 --   10/13/22 2313 -- -- -- -- 18 --   10/13/22 2132 -- -- -- 62 16 96 %   10/13/22 2044 97/60 97.6 °F (36.4 °C) Oral 62 16 96 %   10/13/22 1947 -- -- -- -- 16 --   10/13/22 1645 101/61 -- -- 69 16 94 %   10/13/22 1239 105/66 -- -- 68 -- --   10/13/22 1236 100/66 -- -- 68 -- 93 %   10/13/22 1215 (!) 82/52 -- Oral 66 -- --   10/13/22 1202 -- -- -- -- -- 94 %   10/13/22 1106 -- -- -- -- -- 97 %   10/13/22 1057 134/72 97.7 °F (36.5 °C) Oral 67 -- 98 %   10/13/22 1045 123/77 -- -- 66 12 97 %   10/13/22 1030 134/78 -- -- 65 10 98 %   10/13/22 1015 (!) 166/87 -- -- 69 12 99 %   10/13/22 1012 -- -- -- -- 16 --   10/13/22 1000 130/82 -- -- 65 12 95 %   10/13/22 0945 137/80 -- -- 70 13 96 %   10/13/22 0940 -- -- -- -- 16 --   10/13/22 0930 136/78 -- -- 62 10 94 %   10/13/22 0921 -- -- -- -- 12 96 %   10/13/22 0915 134/79 -- -- 64 18 93 %   10/13/22 0907 -- -- -- -- 21 92 %   10/13/22 0905 134/76 -- Temporal 63 12 93 %   10/13/22 0900 119/73 -- -- 69 15 92 %   10/13/22 0857 -- -- -- -- 16 92 %   10/13/22 0855 134/81 -- -- 68 14 92 %   10/13/22 0851 136/87 97.1 °F (36.2 °C) Temporal 70 14 92 %     No data found.         Intake/Output Summary (Last 24 hours) at 10/14/2022 0848  Last data filed at 10/14/2022 0342  Gross per 24 hour   Intake 740 ml   Output 300 ml   Net 440 ml         Physical Exam: (2-7 system for EPF/Detailed, ?8 for Comprehensive)  /66   Pulse 67   Temp 97.4 °F (36.3 °C) (Oral)   Resp 16   Ht 5' 3\" (1.6 m)   Wt 224 lb (101.6 kg)   SpO2 98%   BMI 39.68 kg/m²   Constitutional: vitals as above: alert, appears stated age and cooperative    Psychiatric: normal insight and judgment, oriented to person, place, time, and general circumstances    Head: Normocephalic, without obvious abnormality, atraumatic    Eyes:lids and lashes normal, conjunctivae and sclerae normal and pupils equal, round, reactive to light and accomodation    EMNT: external ears normal, nares midline    Neck: no carotid bruit, supple, symmetrical, trachea midline and thyroid not enlarged, symmetric, no tenderness/mass/nodules     Respiratory: clear to auscultation and percussion bilaterally with normal respiratory effort    Cardiovascular: normal rate, regular rhythm, normal S1 and S2 and no murmurs    Gastrointestinal: soft, non-tender, non-distended, normal bowel sounds, no masses or organomegaly    Extremities: no clubbing, no edema  ; L knee incision cdi  Skin:No rashes or nodules noted. Neurologic:negative         Labs:  Lab Results   Component Value Date    WBC 6.6 10/14/2022    HGB 9.4 (L) 10/14/2022    HCT 27.7 (L) 10/14/2022     10/14/2022    CHOL 160 09/15/2014    TRIG 66 09/15/2014    HDL 51 09/15/2014    ALT 13 09/19/2022    AST 32 09/19/2022     (L) 10/14/2022    K 3.4 (L) 10/14/2022    CL 98 (L) 10/14/2022    CREATININE <0.5 (L) 10/14/2022    BUN 9 10/14/2022    CO2 24 10/14/2022    TSH 2.39 09/15/2014    INR 1.47 (H) 09/19/2022    LABA1C 4.8 09/19/2022    LABMICR YES 09/19/2022     Lab Results   Component Value Date    TROPONINI 0.009 07/08/2012       Recent Imaging Results are Reviewed:  XR KNEE LEFT (1-2 VIEWS)    Result Date: 10/13/2022  EXAMINATION: 2 XRAY VIEWS OF THE LEFT KNEE 10/13/2022 9:13 am COMPARISON: None. HISTORY: ORDERING SYSTEM PROVIDED HISTORY: post op film TECHNOLOGIST PROVIDED HISTORY: Of operative side while in recovery room. Reason for exam:->post op film Reason for Exam: post op film FINDINGS: There is a total knee arthroplasty with cemented components. No acute fracture or dislocation. No acute hardware failure or loosening. Grossly normal alignment. There is a joint effusion. Total knee arthropasty without acute hardware complication. Lab Results   Component Value Date/Time    GLUCOSE 91 10/14/2022 05:37 AM     Lab Results   Component Value Date/Time    POCGLU 128 10/13/2022 12:29 PM     /66   Pulse 67   Temp 97.4 °F (36.3 °C) (Oral)   Resp 16   Ht 5' 3\" (1.6 m)   Wt 224 lb (101.6 kg)   SpO2 98%   BMI 39.68 kg/m²     Assessment and Plan:  Principal Problem:    Arthritis of left knee -Established problem. Improving. Plan:  stay on post op pathway. To work with pt/ot today. Transition to oral pain meds. Cont to follow h/h to assess post op anemia.    Active Problems: Hypothyroid -Established problem. Stable. Plan: cont synthroid    Morbid obesity ([address] -Established problem. Stable. bmi nearly 40  Plan: advise to increase activity     Hyponatremia -Established problem. Improving. Plan: better. Would have pt to discuss with PCP about changing from maxzide    Acute blood loss as cause of postoperative anemia -New Problem to me. Plan: No indication for transfusion. Cont to monitor h/h to assess progression of anemia. Recommend ferrous sulfate or MVI as outpatient. Asthma  Plan: cont meds    Essential hypertension -Established problem. Stable. 100/66  Plan: Continue present orders/plan.       Disp - medically stable      (Please note that portions of this note were completed with a voice recognition program.  Efforts were made to edit the dictations but occasionally words are mis-transcribed.)        Paty Norris MD  10/14/2022

## 2022-10-14 NOTE — PLAN OF CARE
Problem: Discharge Planning  Goal: Discharge to home or other facility with appropriate resources  10/14/2022 0237 by Zachary Tellez RN  Outcome: Progressing     Problem: Pain  Goal: Verbalizes/displays adequate comfort level or baseline comfort level  10/14/2022 0237 by Zachary Tellez RN  Outcome: Progressing

## (undated) DEVICE — SCREW BNE HD 3.5X48 MM HEX PERSONA
Type: IMPLANTABLE DEVICE | Site: KNEE | Status: NON-FUNCTIONAL
Removed: 2022-10-13

## (undated) DEVICE — BANDAGE COBAN 6 IN WND 6INX5YD FOAM

## (undated) DEVICE — ELECTRODE PT RET AD L9FT HI MOIST COND ADH HYDRGEL CORDED

## (undated) DEVICE — 450 ML BOTTLE OF 0.05% CHLORHEXIDINE GLUCONATE IN 99.95% STERILE WATER FOR IRRIGATION, USP AND APPLICATOR.: Brand: IRRISEPT ANTIMICROBIAL WOUND LAVAGE

## (undated) DEVICE — SHEET,DRAPE,53X77,STERILE: Brand: MEDLINE

## (undated) DEVICE — FAIRFIELD KNEE LF

## (undated) DEVICE — DRESSING CNTCT 4X12IN IS THERABOND 3D

## (undated) DEVICE — 3M™ IOBAN™ 2 ANTIMICROBIAL INCISE DRAPE 6648EZ: Brand: IOBAN™ 2

## (undated) DEVICE — SUTURE ETHBND SZ 1 L18IN NONABSORBABLE CTX L48MM 1/2 CIR CX30D

## (undated) DEVICE — BLADE CLIPPER GEN PURP NS

## (undated) DEVICE — KNEE HOLDER DISPOSABLE LINER: Brand: ALVARADO®  KNEE SUPPORT

## (undated) DEVICE — HOOD: Brand: FLYTE

## (undated) DEVICE — YANKAUER,OPEN TIP,W/O VENT,STERILE: Brand: MEDLINE INDUSTRIES, INC.

## (undated) DEVICE — PAD ABSRB W8XL10IN ABD HYDROPHOBIC NONWOVEN THCK LAYR CELOS

## (undated) DEVICE — SYRINGE MED 10ML TRNSLUC BRL PLUNG BLK MRK POLYPR CTRL

## (undated) DEVICE — TOTAL BASIC PK

## (undated) DEVICE — SYSTEM SKIN CLOSURE 42CM DERMABOND PRINEO

## (undated) DEVICE — MERCY FAIRFIELD TURNOVER KIT: Brand: MEDLINE INDUSTRIES, INC.

## (undated) DEVICE — CONTAINER,SPECIMEN,OR STERILE,4OZ: Brand: MEDLINE

## (undated) DEVICE — HOOD, PEEL-AWAY: Brand: FLYTE

## (undated) DEVICE — ZIMMER® STERILE DISPOSABLE TOURNIQUET CUFF WITH PLC, DUAL PORT, SINGLE BLADDER, 30 IN. (76 CM)

## (undated) DEVICE — STRYKER PERFORMANCE SERIES SAGITTAL BLADE: Brand: STRYKER PERFORMANCE SERIES

## (undated) DEVICE — PENCIL SMK EVAC TELSCP 3 M TBNG

## (undated) DEVICE — HANDPIECE SET WITH HIGH FLOW TIP AND SUCTION TUBE: Brand: INTERPULSE

## (undated) DEVICE — APPLICATOR PREP 26ML 0.7% IOD POVACRYLEX 74% ISO ALC ST

## (undated) DEVICE — SYSTEM SKIN CLSR 22CM DERMBND PRINEO

## (undated) DEVICE — COVER LT HNDL BLU PLAS

## (undated) DEVICE — STERILE TOTAL KNEE DRAPE PACK: Brand: CARDINAL HEALTH

## (undated) DEVICE — DECANTER FLD 9IN ST BG FOR ASEP TRNSF OF FLD

## (undated) DEVICE — SUTURE MONOCRYL PLUS UNDYED PS 3-0 45CM STRATAFIX SPIRAL

## (undated) DEVICE — SCREW BNE L25MM DIA2.5MM KNEE FULL THRD HEX FEM PERSONA
Type: IMPLANTABLE DEVICE | Site: KNEE | Status: NON-FUNCTIONAL
Removed: 2022-10-13

## (undated) DEVICE — COVER,MAYO STAND,XL,STERILE: Brand: MEDLINE

## (undated) DEVICE — STANDARD HYPODERMIC NEEDLE,POLYPROPYLENE HUB: Brand: MONOJECT

## (undated) DEVICE — BLADE RMR L51MM PAT PILOT H

## (undated) DEVICE — HEADLESS TROCHAR PIN 75MM: Brand: ZUK

## (undated) DEVICE — PADDING UNDERCAST W4INXL4YD 100% COT CRIMPED FINISH WBRL II

## (undated) DEVICE — SUTURE VCRL + SZ 0 L18IN ABSRB UD L36MM CT-1 1/2 CIR VCP840D

## (undated) DEVICE — SUTURE VCRL + SZ 2-0 L18IN ABSRB UD CT1 L36MM 1/2 CIR VCP839D

## (undated) DEVICE — 3 BONE CEMENT MIXER: Brand: MIXEVAC

## (undated) DEVICE — PEN: MARKING STD 100/CS: Brand: MEDICAL ACTION INDUSTRIES

## (undated) DEVICE — DRAPE,U/ SHT,SPLIT,PLAS,STERIL: Brand: MEDLINE